# Patient Record
Sex: FEMALE | Race: WHITE | NOT HISPANIC OR LATINO | Employment: UNEMPLOYED | URBAN - METROPOLITAN AREA
[De-identification: names, ages, dates, MRNs, and addresses within clinical notes are randomized per-mention and may not be internally consistent; named-entity substitution may affect disease eponyms.]

---

## 2017-10-31 ENCOUNTER — TRANSFERRED RECORDS (OUTPATIENT)
Dept: HEALTH INFORMATION MANAGEMENT | Facility: CLINIC | Age: 37
End: 2017-10-31

## 2017-10-31 LAB
CHOLEST SERPL-MCNC: 169 MG/DL (ref 100–199)
GLUCOSE SERPL-MCNC: 87 MG/DL (ref 65–140)
HDLC SERPL-MCNC: 45 MG/DL
LDLC SERPL CALC-MCNC: 100 MG/DL
NONHDLC SERPL-MCNC: 124 MG/DL
PAP-ABSTRACT: NORMAL
TRIGL SERPL-MCNC: 120 MG/DL
TSH SERPL-ACNC: 2.31 UIU/ML (ref 0.35–4.94)

## 2017-12-03 ENCOUNTER — HEALTH MAINTENANCE LETTER (OUTPATIENT)
Age: 37
End: 2017-12-03

## 2019-04-13 ENCOUNTER — OFFICE VISIT (OUTPATIENT)
Dept: URGENT CARE | Facility: URGENT CARE | Age: 39
End: 2019-04-13
Payer: COMMERCIAL

## 2019-04-13 VITALS
TEMPERATURE: 99 F | HEART RATE: 80 BPM | SYSTOLIC BLOOD PRESSURE: 117 MMHG | OXYGEN SATURATION: 98 % | DIASTOLIC BLOOD PRESSURE: 76 MMHG

## 2019-04-13 DIAGNOSIS — K12.0 APHTHOUS ULCER: Primary | ICD-10-CM

## 2019-04-13 LAB
DEPRECATED S PYO AG THROAT QL EIA: NORMAL
SPECIMEN SOURCE: NORMAL

## 2019-04-13 PROCEDURE — 87880 STREP A ASSAY W/OPTIC: CPT | Performed by: PHYSICIAN ASSISTANT

## 2019-04-13 PROCEDURE — 87081 CULTURE SCREEN ONLY: CPT | Performed by: PHYSICIAN ASSISTANT

## 2019-04-13 PROCEDURE — 99203 OFFICE O/P NEW LOW 30 MIN: CPT | Performed by: PHYSICIAN ASSISTANT

## 2019-04-13 RX ORDER — MONTELUKAST SODIUM 10 MG/1
10 TABLET ORAL
COMMUNITY
Start: 2019-03-08 | End: 2019-09-16

## 2019-04-13 RX ORDER — LEVOTHYROXINE SODIUM 100 UG/1
100 TABLET ORAL
COMMUNITY
Start: 2019-03-08 | End: 2019-09-16

## 2019-04-13 NOTE — PROGRESS NOTES
SUBJECTIVE:   Stephanie Bradshaw is a 38 year old female presenting with a chief complaint of sore throat.  Onset of symptoms was 4 day(s) ago.  Course of illness is worsening.    Severity moderately severe  Current and Associated symptoms: NONE. Denies headache, fever, chills, URI symptoms, cough or rash  Treatment measures tried include None tried.  Predisposing factors include Thought she had a tonsil stone, so tried to remove, then an ulcer showed up    Past Medical History:   Diagnosis Date     NO ACTIVE PROBLEMS      Current Outpatient Medications   Medication Sig Dispense Refill     levothyroxine (SYNTHROID) 100 MCG tablet Take 100 mcg by mouth       montelukast (SINGULAIR) 10 MG tablet Take 10 mg by mouth       sertraline (ZOLOFT) 100 MG tablet Take by mouth daily 1 x a day       fluticasone (FLONASE) 50 MCG/ACT nasal spray Spray 2 sprays into both nostrils daily 1 spray daily       omeprazole (PRILOSEC) 20 MG capsule 30 mg BID x 30 days 90 capsule 6     Social History     Tobacco Use     Smoking status: Former Smoker     Types: Cigarettes     Last attempt to quit: 1/19/2009     Years since quitting: 10.2   Substance Use Topics     Alcohol use: Yes     Comment: rarely       ROS:  Review of systems negative except as stated above.    OBJECTIVE:  /76   Pulse 80   Temp 99  F (37.2  C) (Oral)   SpO2 98%   GENERAL APPEARANCE: healthy, alert and no distress  EYES: EOMI,  PERRL, conjunctiva clear  HENT: TM's normal bilaterally, tonsillar erythema, tonsillar exudate and L anterior tonsillar column has ulcer noted   NECK: supple, nontender, no lymphadenopathy  RESP: lungs clear to auscultation - no rales, rhonchi or wheezes  CV: regular rates and rhythm, normal S1 S2, no murmur noted  NEURO: Normal strength and tone, sensory exam grossly normal,  normal speech and mentation  SKIN: no suspicious lesions or rashes    Results for orders placed or performed in visit on 04/13/19   Strep, Rapid Screen   Result Value  Ref Range    Specimen Description Throat     Rapid Strep A Screen       NEGATIVE: No Group A streptococcal antigen detected by immunoassay, await culture report.       ASSESSMENT / PLAN:  1. Aphthous ulcer  Use mouthwash as directed for aphthous ulcer  Avoid spicy, acidity and hot foods  - Beta strep group A culture  - magic mouthwash (ENTER INGREDIENTS IN COMMENTS) suspension; Swish and spit 5mL of solution every 4 hours as needed for sore throat  Dispense: 120 mL; Refill: 0    I have discussed the patient's diagnosis and my plan of treatment with the patient. We went over any labs or imaging. Patient is aware to come back in with worsening symptoms or if no relief despite treatment plan.  Patient verbalizes understanding. All questions were addressed and answered.   Cesia Davis PA-C

## 2019-04-14 LAB
BACTERIA SPEC CULT: NORMAL
SPECIMEN SOURCE: NORMAL

## 2019-09-16 ENCOUNTER — OFFICE VISIT (OUTPATIENT)
Dept: FAMILY MEDICINE | Facility: CLINIC | Age: 39
End: 2019-09-16
Payer: COMMERCIAL

## 2019-09-16 ENCOUNTER — TELEPHONE (OUTPATIENT)
Dept: FAMILY MEDICINE | Facility: CLINIC | Age: 39
End: 2019-09-16

## 2019-09-16 VITALS
TEMPERATURE: 99 F | DIASTOLIC BLOOD PRESSURE: 74 MMHG | SYSTOLIC BLOOD PRESSURE: 110 MMHG | WEIGHT: 187 LBS | RESPIRATION RATE: 19 BRPM | HEIGHT: 64 IN | BODY MASS INDEX: 31.92 KG/M2 | HEART RATE: 72 BPM | OXYGEN SATURATION: 97 %

## 2019-09-16 DIAGNOSIS — R10.9 ABDOMINAL CRAMPING: ICD-10-CM

## 2019-09-16 DIAGNOSIS — J45.909 ASTHMA DUE TO ENVIRONMENTAL ALLERGIES: ICD-10-CM

## 2019-09-16 DIAGNOSIS — Z11.4 ENCOUNTER FOR SCREENING FOR HIV: ICD-10-CM

## 2019-09-16 DIAGNOSIS — N94.3 PREMENSTRUAL SYNDROME: Primary | ICD-10-CM

## 2019-09-16 DIAGNOSIS — E03.9 HYPOTHYROIDISM, UNSPECIFIED TYPE: ICD-10-CM

## 2019-09-16 DIAGNOSIS — Z12.83 SKIN CANCER SCREENING: ICD-10-CM

## 2019-09-16 DIAGNOSIS — Z78.9 VEGETARIAN DIET: ICD-10-CM

## 2019-09-16 LAB
DEPRECATED CALCIDIOL+CALCIFEROL SERPL-MC: 32 UG/L (ref 20–75)
ERYTHROCYTE [DISTWIDTH] IN BLOOD BY AUTOMATED COUNT: 12.7 % (ref 10–15)
FOLATE SERPL-MCNC: 16.2 NG/ML
HCT VFR BLD AUTO: 41.7 % (ref 35–47)
HGB BLD-MCNC: 14.7 G/DL (ref 11.7–15.7)
HIV 1+2 AB+HIV1 P24 AG SERPL QL IA: NONREACTIVE
MCH RBC QN AUTO: 31.5 PG (ref 26.5–33)
MCHC RBC AUTO-ENTMCNC: 35.3 G/DL (ref 31.5–36.5)
MCV RBC AUTO: 89 FL (ref 78–100)
PLATELET # BLD AUTO: 231 10E9/L (ref 150–450)
RBC # BLD AUTO: 4.67 10E12/L (ref 3.8–5.2)
TSH SERPL DL<=0.005 MIU/L-ACNC: 1.04 MU/L (ref 0.4–4)
VIT B12 SERPL-MCNC: 417 PG/ML (ref 193–986)
WBC # BLD AUTO: 5.1 10E9/L (ref 4–11)

## 2019-09-16 PROCEDURE — 87389 HIV-1 AG W/HIV-1&-2 AB AG IA: CPT | Performed by: FAMILY MEDICINE

## 2019-09-16 PROCEDURE — 99214 OFFICE O/P EST MOD 30 MIN: CPT | Performed by: FAMILY MEDICINE

## 2019-09-16 PROCEDURE — 83921 ORGANIC ACID SINGLE QUANT: CPT | Performed by: FAMILY MEDICINE

## 2019-09-16 PROCEDURE — 82746 ASSAY OF FOLIC ACID SERUM: CPT | Performed by: FAMILY MEDICINE

## 2019-09-16 PROCEDURE — 85027 COMPLETE CBC AUTOMATED: CPT | Performed by: FAMILY MEDICINE

## 2019-09-16 PROCEDURE — 83090 ASSAY OF HOMOCYSTEINE: CPT | Performed by: FAMILY MEDICINE

## 2019-09-16 PROCEDURE — 82607 VITAMIN B-12: CPT | Performed by: FAMILY MEDICINE

## 2019-09-16 PROCEDURE — 84443 ASSAY THYROID STIM HORMONE: CPT | Performed by: FAMILY MEDICINE

## 2019-09-16 PROCEDURE — 82306 VITAMIN D 25 HYDROXY: CPT | Performed by: FAMILY MEDICINE

## 2019-09-16 PROCEDURE — 36415 COLL VENOUS BLD VENIPUNCTURE: CPT | Performed by: FAMILY MEDICINE

## 2019-09-16 RX ORDER — MONTELUKAST SODIUM 10 MG/1
10 TABLET ORAL AT BEDTIME
Qty: 90 TABLET | Refills: 3 | Status: SHIPPED | OUTPATIENT
Start: 2019-09-16 | End: 2022-06-07

## 2019-09-16 RX ORDER — HYDROCODONE BITARTRATE AND ACETAMINOPHEN 5; 325 MG/1; MG/1
1 TABLET ORAL
COMMUNITY
Start: 2019-03-08 | End: 2020-01-31

## 2019-09-16 RX ORDER — LEVOTHYROXINE SODIUM 100 UG/1
100 TABLET ORAL DAILY
Qty: 90 TABLET | Refills: 3
Start: 2019-09-16 | End: 2019-09-17

## 2019-09-16 RX ORDER — ALBUTEROL SULFATE 90 UG/1
2 AEROSOL, METERED RESPIRATORY (INHALATION) PRN
COMMUNITY
Start: 2019-03-08

## 2019-09-16 RX ORDER — HYDROCODONE BITARTRATE AND ACETAMINOPHEN 5; 325 MG/1; MG/1
1 TABLET ORAL DAILY PRN
Qty: 40 TABLET | Refills: 0 | Status: SHIPPED | OUTPATIENT
Start: 2019-09-16 | End: 2019-09-19

## 2019-09-16 ASSESSMENT — MIFFLIN-ST. JEOR: SCORE: 1513.23

## 2019-09-16 NOTE — PATIENT INSTRUCTIONS
Zoloft 50 mg daily for one month  If symptoms continue to be present then increasing to 75 mg daily for one month  If symptoms continue to be present then increasing to 100 mg daily for one month.   Follow if symptoms worsen or fail to improve.      E-visit or phone visit in 6 months if insurance doesn't cover the one year script for Norco.

## 2019-09-16 NOTE — PROGRESS NOTES
"Subjective     Stephanie Bradshaw is a 38 year old female who presents to clinic today for the following health issues:    HPI     Asthma Follow-Up      Was ACT completed today?  Yes      How many days per week do you miss taking your asthma controller medication?  0    Please describe any recent triggers for your asthma: exercise or sports    Have you had any Emergency Room Visits, Urgent Care Visits, or Hospital Admissions since your last office visit?  No      Hypothyroidism Follow-up      Since last visit, patient describes the following symptoms: fatigue and hair loss      Medication Followup of hydrocodone     Taking Medication as prescribed: yes    Side Effects:  None    Medication Helping Symptoms:  Yes    Taking for menstrual cramps        Premenstrual syndrome - She has progesterone IUD. She takes Zoloft 25 mg daily for around one year to help control the symptoms. She feels that symptoms could be better controlled and interested in increasing medication dose.     She takes Norco 5-325 mg daily for abdominal cramping prior to menstrual cycle. Since IUD, she has intermittent cramping. Last GYN visit was around 2 years ago. Since she moved to [place] - she was with [facility] provider.     Allergies - She takes Flonase and allegra.     Patient has been a vegetarian for 1.5 years and wants routine lab work. Not on any supplements.       Reviewed and updated as needed this visit by Provider         Review of Systems   ROS COMP: Constitutional, HEENT, cardiovascular, pulmonary, gi and gu systems are negative, except as otherwise noted.      Objective    There were no vitals taken for this visit.  There is no height or weight on file to calculate BMI.  Physical Exam   /74 (BP Location: Right arm, Patient Position: Sitting, Cuff Size: Adult Regular)   Pulse 72   Temp 99  F (37.2  C) (Oral)   Resp 19   Ht 1.626 m (5' 4\")   Wt 84.8 kg (187 lb)   SpO2 97%   BMI 32.10 kg/m    GENERAL: healthy, alert and no " distress  EYES: Eyes grossly normal to inspection  HENT: nose and mouth without ulcers or lesions  NECK: no adenopathy, no asymmetry, masses, or scars and thyroid normal to palpation  RESP: lungs clear to auscultation - no rales, rhonchi or wheezes  CV: regular rate and rhythm, normal S1 S2  MS: no gross musculoskeletal defects noted  SKIN: no suspicious lesions or rashes  NEURO:mentation intact and speech normal  PSYCH: mentation appears normal, affect normal    Diagnostic Test Results:  none         Assessment & Plan       1. Premenstrual syndrome  - Reviewed care everywhere.   - advised below  Zoloft 50 mg daily for one month  If symptoms continue to be present then increasing to 75 mg daily for one month  If symptoms continue to be present then increasing to 100 mg daily for one month.   Follow if symptoms worsen or fail to improve.  - sertraline (ZOLOFT) 50 MG tablet; Take 1 tablet (50 mg) by mouth daily  Dispense: 90 tablet; Refill: 3    2. Vegetarian diet  - ordered below to ensure no vitamin deficiencies as patient is a vegetarian   - Vitamin D Deficiency  - CBC with platelets  - Folate  - Vitamin B12  - Homocysteine  - Methylmalonic Acid  - JUST IN CASE    3. Abdominal cramping  - Reviewed care everywhere and [place] . Patient has used 40# over the last year. Discussed contact - need to have patient sign it during next visit as I was unable to find the letter.   - HYDROcodone-acetaminophen (NORCO) 5-325 MG tablet; Take 1 tablet by mouth daily as needed for pain Last one year (09/2019)  Dispense: 40 tablet; Refill: 0    4. Asthma due to environmental allergies  - stable   - montelukast (SINGULAIR) 10 MG tablet; Take 1 tablet (10 mg) by mouth At Bedtime  Dispense: 90 tablet; Refill: 3    5. Hypothyroidism, unspecified type  - levothyroxine (SYNTHROID) 100 MCG tablet; Take 1 tablet (100 mcg) by mouth daily Please dispense brand name  Dispense: 90 tablet; Refill: 3  - TSH with free T4 reflex    6. Encounter for  screening for HIV  - HIV Antigen Antibody Combo    7. Skin cancer screening  - DERMATOLOGY REFERRAL      Return in about 6 months (around 3/16/2020) for Routine Visit.     E-visit or phone visit in 6 months if insurance doesn't cover the one year script for [place].     Sophia Caal MD  [facility]

## 2019-09-16 NOTE — TELEPHONE ENCOUNTER
PA needed on Hydrocodone/APAP 5/325mg  Pt insurance is [employer]  Insurance phone number: [phone]  Pt ID number: [id number]  Please let us know when PA is granted/denied.    Insurance only allows a 7 days supply max without a PA.    Hasmukh Rios  [facility]  [phone]    Thank you.

## 2019-09-17 RX ORDER — LEVOTHYROXINE SODIUM 100 UG/1
100 TABLET ORAL DAILY
Qty: 90 TABLET | Refills: 3 | Status: SHIPPED | OUTPATIENT
Start: 2019-09-17 | End: 2020-09-23

## 2019-09-18 LAB — HCYS SERPL-SCNC: 6.9 UMOL/L (ref 4–12)

## 2019-09-19 NOTE — TELEPHONE ENCOUNTER
Central Prior Authorization Team  Phone: [phone]    PA Initiation    Medication: Hydrocodone/APAP 5/325mg  Insurance Company: [employer] - Phone [phone] Fax [phone]  Pharmacy Filling the Rx: [place], [place] - [address]  Filling Pharmacy Phone: [phone]  Filling Pharmacy Fax:    Start Date: 9/19/2019

## 2019-09-20 NOTE — TELEPHONE ENCOUNTER
Prior Authorization Approval    Authorization Effective Date: 9/20/2019  Authorization Expiration Date: 3/18/2020  Medication: Hydrocodone/APAP 5/325mg- APPROVED   Approved Dose/Quantity:   Reference #:     Insurance Company: "[employer]" - Phone [phone] Fax [phone]  Expected CoPay:       CoPay Card Available:      Foundation Assistance Needed:    Which Pharmacy is filling the prescription (Not needed for infusion/clinic administered): [facility] [place], [place] - [address]  Pharmacy Notified: Yes  Patient Notified: Comment:  **Instructed pharmacy to notify patient when script is ready to /ship.**

## 2019-09-23 LAB — METHYLMALONATE SERPL-SCNC: 0.15 UMOL/L (ref 0–0.4)

## 2019-09-24 ENCOUNTER — OFFICE VISIT (OUTPATIENT)
Dept: OPHTHALMOLOGY | Facility: CLINIC | Age: 39
End: 2019-09-24
Payer: COMMERCIAL

## 2019-09-24 DIAGNOSIS — H52.13 MYOPIA OF BOTH EYES: Primary | ICD-10-CM

## 2019-09-24 ASSESSMENT — CONF VISUAL FIELD
METHOD: COUNTING FINGERS
OD_NORMAL: 1
OS_NORMAL: 1

## 2019-09-24 ASSESSMENT — CUP TO DISC RATIO
OS_RATIO: 0.2
OD_RATIO: 0.2

## 2019-09-24 ASSESSMENT — VISUAL ACUITY
OD_CC: J1+
CORRECTION_TYPE: GLASSES
OD_CC: 20/20
OS_CC: 20/20
METHOD: SNELLEN - LINEAR
OS_CC: J1+

## 2019-09-24 ASSESSMENT — REFRACTION_MANIFEST
OS_SPHERE: -1.00
OD_CYLINDER: +0.25
OS_CYLINDER: +0.25
OS_AXIS: 066
OD_SPHERE: -0.75
OD_AXIS: 115

## 2019-09-24 ASSESSMENT — TONOMETRY
OS_IOP_MMHG: 18
IOP_METHOD: ICARE
OD_IOP_MMHG: 17

## 2019-09-24 ASSESSMENT — SLIT LAMP EXAM - LIDS
COMMENTS: NORMAL
COMMENTS: NORMAL

## 2019-09-24 ASSESSMENT — EXTERNAL EXAM - LEFT EYE: OS_EXAM: NORMAL

## 2019-09-24 ASSESSMENT — REFRACTION_WEARINGRX
OS_SPHERE: -0.75
OD_SPHERE: -0.50
OS_AXIS: 066
OD_AXIS: 111
SPECS_TYPE: SVL
OS_CYLINDER: +0.50
OD_CYLINDER: +0.25

## 2019-09-24 ASSESSMENT — EXTERNAL EXAM - RIGHT EYE: OD_EXAM: NORMAL

## 2019-09-24 NOTE — PROGRESS NOTES
History  HPI     Annual Eye Exam     Associated symptoms include Negative for eye pain, dryness, tearing, floaters and flashes.              Comments     Last CEE about 1.5 years ago.  Pt thinks VA is stable.  Pt wants new glasses Rx, no CLS Rx.  Pt has no eye pain or other concerns today.          Last edited by Israel Kaur COT on 9/24/2019  3:12 PM. (History)          Assessment/Plan  (H52.13) Myopia of both eyes  (primary encounter diagnosis)  Comment: Myopia both eyes, wears glasses as needed  Plan: REFRACTION         Educated patient on condition and clinical findings. Dispensed spectacle prescription for as-needed wear. Educated patient on possibility of adaptation period, if symptoms do not improve return to clinic for further testing.    Return to clinic in 1 year for comprehensive eye exam.    Complete documentation of historical and exam elements from today's encounter can  be found in the full encounter summary report (not reduplicated in this progress  note). I personally obtained the chief complaint(s) and history of present illness. I  confirmed and edited as necessary the review of systems, past medical/surgical  history, family history, social history, and examination findings as documented by  others; and I examined the patient myself. I personally reviewed the relevant tests,  images, and reports as documented above. I formulated and edited as necessary the  assessment and plan and discussed the findings and management plan with the  patient and family.    Juan Cabezas OD, FAAO

## 2019-12-19 ENCOUNTER — MYC REFILL (OUTPATIENT)
Dept: FAMILY MEDICINE | Facility: CLINIC | Age: 39
End: 2019-12-19

## 2019-12-19 DIAGNOSIS — E03.9 HYPOTHYROIDISM, UNSPECIFIED TYPE: ICD-10-CM

## 2019-12-19 RX ORDER — LEVOTHYROXINE SODIUM 100 UG/1
100 TABLET ORAL DAILY
Qty: 90 TABLET | Refills: 3 | Status: CANCELLED | OUTPATIENT
Start: 2019-12-19

## 2019-12-23 ENCOUNTER — MYC REFILL (OUTPATIENT)
Dept: FAMILY MEDICINE | Facility: CLINIC | Age: 39
End: 2019-12-23

## 2019-12-23 DIAGNOSIS — E03.9 HYPOTHYROIDISM, UNSPECIFIED TYPE: ICD-10-CM

## 2019-12-23 RX ORDER — LEVOTHYROXINE SODIUM 100 UG/1
100 TABLET ORAL DAILY
Qty: 90 TABLET | Refills: 3 | Status: CANCELLED | OUTPATIENT
Start: 2019-12-23

## 2019-12-24 NOTE — TELEPHONE ENCOUNTER
"Requested Prescriptions   Pending Prescriptions Disp Refills     levothyroxine (SYNTHROID) 100 MCG tablet 90 tablet 3     Sig: Take 1 tablet (100 mcg) by mouth daily Please dispense brand name       Thyroid Protocol Passed - 12/23/2019  8:20 AM        Passed - Patient is 12 years or older        Passed - Recent (12 mo) or future (30 days) visit within the authorizing provider's specialty     Patient has had an office visit with the authorizing provider or a provider within the authorizing providers department within the previous 12 mos or has a future within next 30 days. See \"Patient Info\" tab in inbasket, or \"Choose Columns\" in Meds & Orders section of the refill encounter.              Passed - Medication is active on med list        Passed - Normal TSH on file in past 12 months     Recent Labs   Lab Test 09/16/19  0949   TSH 1.04              Passed - No active pregnancy on record     If patient is pregnant or has had a positive pregnancy test, please check TSH.          Passed - No positive pregnancy test in past 12 months     If patient is pregnant or has had a positive pregnancy test, please check TSH.          Duplicate sent [employer]    "

## 2020-01-29 ENCOUNTER — OFFICE VISIT (OUTPATIENT)
Dept: OBGYN | Facility: CLINIC | Age: 40
End: 2020-01-29
Payer: COMMERCIAL

## 2020-01-29 VITALS — SYSTOLIC BLOOD PRESSURE: 120 MMHG | OXYGEN SATURATION: 97 % | HEART RATE: 84 BPM | DIASTOLIC BLOOD PRESSURE: 88 MMHG

## 2020-01-29 DIAGNOSIS — Z30.432 ENCOUNTER FOR IUD REMOVAL: Primary | ICD-10-CM

## 2020-01-29 LAB — HCG UR QL: NEGATIVE

## 2020-01-29 PROCEDURE — 81025 URINE PREGNANCY TEST: CPT | Performed by: OBSTETRICS & GYNECOLOGY

## 2020-01-29 PROCEDURE — 58301 REMOVE INTRAUTERINE DEVICE: CPT | Performed by: OBSTETRICS & GYNECOLOGY

## 2020-01-29 NOTE — PROGRESS NOTES
Stephanie Bradshaw is a 39 year old P0 who presents requesting IUD removal.  She plans pregnancy.       OBJECTIVE:  Healthy female in NAD.  /88   Pulse 84   SpO2 97%      Speculum is placed in the vagina, and the IUD string is grasped with a ring forceps, and removed without difficulty.      She tolerated this well.     ASSESSMENT:  IUD removal.    PLAN: RTC as needed. (She has an office visit this week with Dr. Siddiqui for family planning)

## 2020-01-31 ENCOUNTER — OFFICE VISIT (OUTPATIENT)
Dept: OBGYN | Facility: CLINIC | Age: 40
End: 2020-01-31
Payer: COMMERCIAL

## 2020-01-31 VITALS
TEMPERATURE: 98.6 F | HEART RATE: 65 BPM | WEIGHT: 195 LBS | BODY MASS INDEX: 33.47 KG/M2 | SYSTOLIC BLOOD PRESSURE: 120 MMHG | DIASTOLIC BLOOD PRESSURE: 81 MMHG

## 2020-01-31 DIAGNOSIS — E03.9 HYPOTHYROIDISM, UNSPECIFIED TYPE: ICD-10-CM

## 2020-01-31 DIAGNOSIS — Z80.3 FAMILY HISTORY OF MALIGNANT NEOPLASM OF BREAST: ICD-10-CM

## 2020-01-31 DIAGNOSIS — Z31.49 PROCREATION MANAGEMENT INVESTIGATION AND TESTING: Primary | ICD-10-CM

## 2020-01-31 DIAGNOSIS — Z78.9 VEGETARIAN: ICD-10-CM

## 2020-01-31 DIAGNOSIS — F32.81 PMDD (PREMENSTRUAL DYSPHORIC DISORDER): ICD-10-CM

## 2020-01-31 PROCEDURE — 99214 OFFICE O/P EST MOD 30 MIN: CPT | Performed by: OBSTETRICS & GYNECOLOGY

## 2020-01-31 NOTE — Clinical Note
Please abstract the following data from this visit with this patient into the appropriate field in Epic:Tests that can be patient reported without a hard copy:Pap smear done on this date: 10/31/2017 (approximately), by this group: [facility], results were NIL Neg HPV. Other Tests found in the patient's chart through Chart Review/Care Everywhere:{Abstract Quality List (Optional):391286}Note to Abstraction: If this section is blank, no results were found via Chart Review/Care Everywhere.

## 2020-01-31 NOTE — PROGRESS NOTES
Chief Complaint: infertility consult     HPI:   Stephanie Bradshaw is a 39 year old female is a 39 year old G0 female who presents with desired procreation.     She had her Mirena IUD removed 2 days ago and would like to discuss getting pregnant.  Her partner is present today, has previously undergone vasectomy, but is planning on pursuing reversal.  Likely wouldn't pursue IVF if there were difficulties in conceiving; would rather pursue adoption in that case.    Prior pregnancy history is as follows:   OB History    Para Term  AB Living   0 0 0 0 0 0   SAB TAB Ectopic Multiple Live Births   0 0 0 0 0       Gynecologic History:  Menstrual History: No LMP recorded. (Menstrual status: IUD). Menses are regular q 28-30 days, lasting 3-5 days. Menarche at age 12.    Dysmenorrhea severe, occurring throughout cycle.   Cyclic symptoms include  irritability and moodiness.   Additional symptoms include none  Ovulation predictor kits: negative  Pelvic/abdominal pain: No    STI:No STD history  History of PID: No   Last PAP smear:  Normal in 10/2017.  HPV not done, so due in 10/2020  Fibroids: No  Uterine anomalies:  No  SEGUNDO exposure in utero: No  Contraceptive history: Mirena IUD and depoprovera  Deep dyspareunia: No  Hirsuitism: No  Galactorrhea: No    History of thyroid problems or symptoms (ie. Intolerance to heat/cold, changes in hair growth, body weight:  Yes  Hypothyroid  History of chemo/radiation:  No    The patient has been trying to conceive for n/a.  Use of lubricants: No    Male factor:   Partner is 36 years old.   No history of trauma to the genitalia, medications, tobacco, drug use. History of vasectomythat plan to have reversed  Prior children: none   Erectile dysfunction: No  Ejaculatory dysfunction: No  Family history of cystic fibrosis: No  Family history of mental retardation: No  Seen by urologist: No   Semen analysis: No      PAST INFERTILITY EVALUATION AND TREATMENT:  Patient's evaluation has  included:  none    Hormonal evaluation has included:  none    Allergies: Patient has no known allergies.                    Past Medical History:   Diagnosis Date     NO ACTIVE PROBLEMS      Thyroid disease 2013     Uncomplicated asthma        Past Surgical History:   Procedure Laterality Date     ABDOMEN SURGERY      Laparoscopy     GYN SURGERY      hysteroscopy     HC TOOTH EXTRACTION W/FORCEP             Social History     Tobacco Use     Smoking status: Former Smoker     Packs/day: 0.00     Years: 0.00     Pack years: 0.00     Types: Cigarettes     Last attempt to quit: 2009     Years since quittin.0     Smokeless tobacco: Never Used   Substance Use Topics     Alcohol use: Yes     Comment: rarely              Family History   Problem Relation Age of Onset     Cancer Maternal Grandfather      Other Cancer Maternal Grandfather         Lymphoma     Cancer Mother         menangiomna     Depression Mother      Thyroid Disease Mother      Other Cancer Mother         Bladder     Anxiety Disorder Mother      Alcohol/Drug Father      Substance Abuse Father      Depression Niece      Anxiety Disorder Niece      Anxiety Disorder Sister      Asthma Niece      Thyroid Disease Sister      Breast Cancer Paternal Aunt 20        diagnosed in 20s,  of breast cancer 30 years later.     Glaucoma No family hx of      Macular Degeneration No family hx of        Current Outpatient Medications   Medication Sig Dispense Refill     albuterol (PROAIR HFA/PROVENTIL HFA/VENTOLIN HFA) 108 (90 Base) MCG/ACT inhaler Inhale 2 puffs into the lungs       fluticasone (FLONASE) 50 MCG/ACT nasal spray Spray 2 sprays into both nostrils daily 1 spray daily       levothyroxine (SYNTHROID) 100 MCG tablet Take 1 tablet (100 mcg) by mouth daily Please dispense brand name 90 tablet 3     montelukast (SINGULAIR) 10 MG tablet Take 1 tablet (10 mg) by mouth At Bedtime 90 tablet 3     sertraline (ZOLOFT) 50 MG tablet Take 1 tablet (50 mg)  "by mouth daily 90 tablet 3       Estimated body mass index is 33.47 kg/m  as calculated from the following:    Height as of 19: 1.626 m (5' 4\").    Weight as of this encounter: 88.5 kg (195 lb).    Exam:  /81 (BP Location: Left arm, Patient Position: Sitting, Cuff Size: Adult Regular)   Pulse 65   Temp 98.6  F (37  C) (Oral)   Wt 88.5 kg (195 lb)   BMI 33.47 kg/m    Gen:  NAD, sitting comfortably  Psych:  Appropriate mood and affect  Neuro:  Gait WNL.  Sensation and strength grossly intact    ASSESSMENT/plan:    1. Procreation management investigation and testing  Recommended daily prenatal vitamin and labs below.  Also discussed HSG, but will hold off on ordering that for now, pending lab results.  They likely wouldn't do anything invasive in terms of fertility treatment, so we will do a more step-wise evaluation  - Follicle stimulating hormone; Future  - Anti-Mullerian hormone; Future  - Estradiol; Future  - Prolactin; Future    2. Hypothyroidism, unspecified type  TSH within recommended range for pregnancy.  No changes at this point    3. Vegetarian  Discussed this isn't a concern as long as she is getting all the protein and nutrients she needs    4. PMDD (premenstrual dysphoric disorder)  Takes sertraline and does well on this. Discussed wouldn't recommend stopping if this has previously been beneficial for her.  There is a risk of  withdrawal, however, in my experience, babies do well.    5.  Family history of malignant neoplasm of breast  Had an aunt who was diagnosed with breast cancer in her 20s and passed away from the disease 30 years later.  Patient thinks genetic testing was done, but unsure of results.  This aunt does have daughters, so she will ask her family.  Recommendation for breast cancer screening for patient pending genetic testing results.  She will notify me of what she finds out via [facility]      Julia Siddiqui MD    Please note greater than 50% of this 30 minute " appointment were spent in counseling with the patient on issues described above, including fertility evaluation and possible treatments, as well as impact of other health issues on fertility.

## 2020-01-31 NOTE — NURSING NOTE
"Chief Complaint   Patient presents with     Consult     family planning       Initial /81 (BP Location: Left arm, Patient Position: Sitting, Cuff Size: Adult Regular)   Pulse 65   Temp 98.6  F (37  C) (Oral)   Wt 88.5 kg (195 lb)   BMI 33.47 kg/m   Estimated body mass index is 33.47 kg/m  as calculated from the following:    Height as of 19: 1.626 m (5' 4\").    Weight as of this encounter: 88.5 kg (195 lb).  BP completed using cuff size: regular    Questioned patient about current smoking habits.  Pt. quit smoking some time ago.          The following HM Due: NONE      The following patient reported/Care Every where data was sent to:  P ABSTRACT QUALITY INITIATIVES [[id number]]  Pap smear done on this date:10/31/2017 (approximately), by this group: [facility], results were NIL Neg HPV.       Phyllis Lewis MA           "

## 2020-01-31 NOTE — PATIENT INSTRUCTIONS
Call the consumer price line to ask about cost of labs.  [phone]    Call on the first day of your menstrual cycle to schedule lab only visit.  If this falls on the weekend, let me know and we can give instructions on going to outpatient lab in the hospital.    See what you can find out about your aunt's genetic testing as it relates to her breast cancer.  Then we'll decide best time for you to start mammograms.

## 2020-02-18 ENCOUNTER — TELEPHONE (OUTPATIENT)
Dept: FAMILY MEDICINE | Facility: CLINIC | Age: 40
End: 2020-02-18

## 2020-02-18 PROBLEM — J45.20 ASTHMA IN ADULT, MILD INTERMITTENT, UNCOMPLICATED: Status: ACTIVE | Noted: 2020-02-18

## 2020-02-18 NOTE — TELEPHONE ENCOUNTER
Panel Management Review      Patient has the following on her problem list:   Asthma review   No flowsheet data found.   1. Is Asthma diagnosis on the Problem List? Yes    2. Is Asthma listed on Health Maintenance? Yes    3. Patient is due for:  ACT and AAP      Composite cancer screening  Chart review shows that this patient is due/due soon for the following None  Summary:    Patient is due/failing the following:   ACT and PHYSICAL    Action needed:   Patient needs office visit for physical and Patient needs to do ACT.    Type of outreach:    Phone, left message for patient to call back.     Questions for provider review:    None                                                                                                                                    Jack Webster MA       Chart routed to Care Team .

## 2020-02-24 ENCOUNTER — MYC REFILL (OUTPATIENT)
Dept: FAMILY MEDICINE | Facility: CLINIC | Age: 40
End: 2020-02-24

## 2020-02-24 DIAGNOSIS — E03.9 HYPOTHYROIDISM, UNSPECIFIED TYPE: ICD-10-CM

## 2020-02-24 DIAGNOSIS — N94.3 PREMENSTRUAL SYNDROME: ICD-10-CM

## 2020-02-24 RX ORDER — LEVOTHYROXINE SODIUM 100 UG/1
100 TABLET ORAL DAILY
Qty: 90 TABLET | Refills: 3 | Status: CANCELLED | OUTPATIENT
Start: 2020-02-24

## 2020-02-24 NOTE — TELEPHONE ENCOUNTER
"Requested Prescriptions   Pending Prescriptions Disp Refills     sertraline (ZOLOFT) 50 MG tablet 90 tablet 3     Sig: Take 1 tablet (50 mg) by mouth daily  Last Written Prescription Date:  9/16/2019  Last Fill Quantity: 90 tablet,  # refills: 3   Last Office Visit: 9/16/2019   Future Office Visit:            SSRIs Protocol Passed - 2/24/2020  7:51 AM        Passed - Recent (12 mo) or future (30 days) visit within the authorizing provider's specialty     Patient has had an office visit with the authorizing provider or a provider within the authorizing providers department within the previous 12 mos or has a future within next 30 days. See \"Patient Info\" tab in inbasket, or \"Choose Columns\" in Meds & Orders section of the refill encounter.            Passed - Medication is active on med list        Passed - Patient is age 18 or older        Passed - No active pregnancy on record        Passed - No positive pregnancy test in last 12 months     _________________________________________________________________       levothyroxine (SYNTHROID) 100 MCG tablet 90 tablet 3     Sig: Take 1 tablet (100 mcg) by mouth daily Please dispense brand name  Last Written Prescription Date:  9/17/2019  Last Fill Quantity: 90 tablet,  # refills: 3   Last Office Visit: 9/16/2019   Future Office Visit:            Thyroid Protocol Passed - 2/24/2020  7:51 AM        Passed - Patient is 12 years or older        Passed - Recent (12 mo) or future (30 days) visit within the authorizing provider's specialty     Patient has had an office visit with the authorizing provider or a provider within the authorizing providers department within the previous 12 mos or has a future within next 30 days. See \"Patient Info\" tab in inbasket, or \"Choose Columns\" in Meds & Orders section of the refill encounter.            Passed - Medication is active on med list        Passed - Normal TSH on file in past 12 months     Recent Labs   Lab Test 09/16/19  0949   TSH " 1.04            Passed - No active pregnancy on record     If patient is pregnant or has had a positive pregnancy test, please check TSH.        Passed - No positive pregnancy test in past 12 months     If patient is pregnant or has had a positive pregnancy test, please check TSH.

## 2020-03-01 DIAGNOSIS — Z31.49 PROCREATION MANAGEMENT INVESTIGATION AND TESTING: ICD-10-CM

## 2020-03-01 LAB
ESTRADIOL SERPL-MCNC: 18 PG/ML
FSH SERPL-ACNC: 9.2 IU/L
PROLACTIN SERPL-MCNC: 12 UG/L (ref 3–27)

## 2020-03-01 PROCEDURE — 83520 IMMUNOASSAY QUANT NOS NONAB: CPT | Performed by: OBSTETRICS & GYNECOLOGY

## 2020-03-01 PROCEDURE — 84146 ASSAY OF PROLACTIN: CPT | Performed by: OBSTETRICS & GYNECOLOGY

## 2020-03-01 PROCEDURE — 82670 ASSAY OF TOTAL ESTRADIOL: CPT | Performed by: OBSTETRICS & GYNECOLOGY

## 2020-03-01 PROCEDURE — 83001 ASSAY OF GONADOTROPIN (FSH): CPT | Performed by: OBSTETRICS & GYNECOLOGY

## 2020-03-02 ENCOUNTER — HEALTH MAINTENANCE LETTER (OUTPATIENT)
Age: 40
End: 2020-03-02

## 2020-03-03 LAB — MIS SERPL-MCNC: 0.36 NG/ML (ref 0.18–11.71)

## 2020-03-19 ENCOUNTER — VIRTUAL VISIT (OUTPATIENT)
Dept: ONCOLOGY | Facility: CLINIC | Age: 40
End: 2020-03-19
Attending: OBSTETRICS & GYNECOLOGY
Payer: COMMERCIAL

## 2020-03-19 DIAGNOSIS — Z84.89 FAMILY HISTORY OF BRAIN TUMOR: ICD-10-CM

## 2020-03-19 DIAGNOSIS — Z80.42 FAMILY HISTORY OF PROSTATE CANCER: ICD-10-CM

## 2020-03-19 DIAGNOSIS — Z84.81 FAMILY HISTORY OF GENE MUTATION: ICD-10-CM

## 2020-03-19 DIAGNOSIS — Z80.3 FAMILY HISTORY OF MALIGNANT NEOPLASM OF BREAST: Primary | ICD-10-CM

## 2020-03-19 NOTE — PATIENT INSTRUCTIONS
Assessing Cancer Risk  Only about 5-10% of cancers are thought to be due to an inherited cancer susceptibility gene.    These families often have:    Several people with the same or related types of cancer    Cancers diagnosed at a young age (before age 50)    Individuals with more than one primary cancer    Multiple generations of the family affected with cancer    Some people may be candidates for genetic testing of more than one gene.  For these families, genetic testing using a cancer panel may be offered.  These panels will test different genes known to increase the risk for breast, ovarian, uterine, and/or other cancers. All of the genes discussed below have published clinical management guidelines for individuals who are found to carry a mutation. The purpose of this handout is to serve as a brief summary of the genes analyzed by the panels used to inquire about hereditary breast and gynecologic cancer:  CRISTHIAN, BRCA1, BRCA2, BRIP1, CDH1, CHEK2, MLH1, MSH2, MSH6, PMS2, EPCAM, PTEN, PALB2, RAD51C, RAD51D, and TP53.  ______________________________________________________________________________  Hereditary Breast and Ovarian Cancer Syndrome   (BRCA1 and BRCA2)  A single mutation in one of the copies of BRCA1 or BRCA2 increases the risk for breast and ovarian cancer, among others.  The risk for pancreatic cancer and melanoma may also be slightly increased in some families.  The chart below shows the chance that someone with a BRCA mutation would develop cancer in his or her lifetime1,2,3,4.        A person s ethnic background is also important to consider, as individuals of Ashkenazi Jehovah's witness ancestry have a higher chance of having a BRCA gene mutation.  There are three BRCA mutations that occur more frequently in this population.    Lira Syndrome   (MLH1, MSH2, MSH6, PMS2, and EPCAM)  Currently five genes are known to cause Lira Syndrome: MLH1, MSH2, MSH6, PMS2, and EPCAM.  A single mutation in one of the  Lira Syndrome genes increases the risk for colon, endometrial, ovarian, and stomach cancers.  Other cancers that occur less commonly in Lira Syndrome include urinary tract, skin, and brain cancers.  The chart below shows the chance that a person with Lira syndrome would develop cancer in his or her lifetime5.      *Cancer risk varies depending on Lira syndrome gene found    Cowden Syndrome   (PTEN)  Cowden syndrome is a hereditary condition that increases the risk for breast, thyroid, endometrial, colon, and kidney cancer.  Cowden syndrome is caused by a mutation in the PTEN gene.  A single mutation in one of the copies of PTEN causes Cowden syndrome and increases cancer risk.  The chart below shows the chance that someone with a PTEN mutation would develop cancer in their lifetime6,7.  Other benign features seen in some individuals with Cowden syndrome include benign skin lesions (facial papules, keratoses, lipomas), learning disability, autism, thyroid nodules, colon polyps, and larger head size.      *One recent study found breast cancer risk to be increased to 85%    Li-Fraumeni Syndrome   (TP53)  Li-Fraumeni Syndrome (LFS) is a cancer predisposition syndrome caused by a mutation in the TP53 gene. A single mutation in one of the copies of TP53 increases the risk for multiple cancers. Individuals with LFS are at an increased risk for developing cancer at a young age. The lifetime risk for development of a LFS-associated cancer is 50% by age 30 and 90% by age 60.   Core Cancers: Sarcomas, Breast, Brain, Lung, Leukemias/Lymphomas, Adrenocortical carcinomas  Other Cancers: Gastrointestinal, Thyroid, Skin, Genitourinary    Hereditary Diffuse Gastric Cancer   (CDH1)  Currently, one gene is known to cause hereditary diffuse gastric cancer (HDGC): CDH1.  Individuals with HDGC are at increased risk for diffuse gastric cancer and lobular breast cancer. Of people diagnosed with HDGC, 30-50% have a mutation in the CDH1  gene.  This suggests there are likely other genes that may cause HDGC that have not been identified yet.      Lifetime Cancer Risks    General Population HDGC    Diffuse Gastric  <1% ~80%   Breast 12% 39-52%         Additional Genes  CRISTHIAN  CRISTHIAN is a moderate-risk breast cancer gene. Women who have a mutation in CRISTHIAN can have between a 2-4 fold increased risk for breast cancer compared to the general population8. CRISTHIAN mutations have also been associated with increased risk for pancreatic cancer, however an estimate of this cancer risk is not well understood9. Individuals who inherit two CRISTHIAN mutations have a condition called ataxia-telangiectasia (AT).  This rare autosomal recessive condition affects the nervous system and immune system, and is associated with progressive cerebellar ataxia beginning in childhood.  Individuals with ataxia-telangiectasia often have a weakened immune system and have an increased risk for childhood cancers.    PALB2  Mutations in PALB2 have been shown to increase the risk of breast cancer up to 33-58% in some families; where individuals fall within this risk range is dependent upon family [id number]. PALB2 mutations have also been associated with increased risk for pancreatic cancer, although this risk has not been quantified yet.  Individuals who inherit two PALB2 mutations--one from their mother and one from their father--have a condition called Fanconi Anemia.  This rare autosomal recessive condition is associated with short stature, developmental delay, bone marrow failure, and increased risk for childhood cancers.    CHEK2   CHEK2 is a moderate-risk breast cancer gene.  Women who have a mutation in CHEK2 have around a 2-fold increased risk for breast cancer compared to the general population, and this risk may be higher depending upon family history.11,12,13 Mutations in CHEK2 have also been shown to increase the risk of a number of other cancers, including colon and prostate, however  these cancer risks are currently not well understood.    BRIP1, RAD51C and RAD51D  Mutations in BRIP1, RAD51C, and RAD51D have been shown to increase the risk of ovarian cancer and possibly female breast cancer as well14,15 .       Lifetime Cancer Risk    General Population BRIP1 RAD51C RAD51D   Ovarian 1-2% ~5-8% ~5-9% ~7-15%           Inheritance  All of the cancer syndromes reviewed above are inherited in an autosomal dominant pattern.  This means that if a parent has a mutation, each of his or her children will have a 50% chance of inheriting that same mutation.  Therefore, each child--male or female--would have a 50% chance of being at increased risk for developing cancer.      Image obtained from Genetics Home Reference, 2013     Mutations in some genes can occur de treasure, which means that a person s mutation occurred for the first time in them and was not inherited from a parent.  Now that they have the mutation, however, it can be passed on to future generations.    Genetic Testing  Genetic testing involves a blood test and will look at the genetic information in the CRISTHIAN, BRCA1, BRCA2, BRIP1, CDH1, CHEK2, MLH1, MSH2, MSH6, PMS2, EPCAM, PTEN, PALB2, RAD51C, RAD51D, and TP53 genes for any harmful mutations that are associated with increased cancer risk.  If possible, it is recommended that the person(s) who has had cancer be tested before other family members.  That person will give us the most useful information about whether or not a specific gene is associated with the cancer in the family.    Results  There are three possible results of genetic testing:    Positive--a harmful mutation was identified in one or more of the genes    Negative--no mutation was identified in any of the genes on this panel    Variant of unknown significance--a variation in one of the genes was identified, but it is unclear how this impacts cancer risk in the family    Advantages and Disadvantages   There are advantages and  disadvantages to genetic testing.    Advantages    May clarify your cancer risk    Can help you make medical decisions    May explain the cancers in your family    May give useful information to your family members (if you share your results)    Disadvantages    Possible negative emotional impact of learning about inherited cancer risk    Uncertainty in interpreting a negative test result in some situations    Possible genetic discrimination concerns (see below)    Genetic Information Nondiscrimination Act (NAUN)  NAUN is a federal law that protects individuals from health insurance or employment discrimination based on a genetic test result alone.  Although rare, there are currently no legal discrimination protections in terms of life insurance, long term care, or disability insurances.  Visit the [employer] website to learn more.    Reducing Cancer Risk  All of the genes described above have nationally recognized cancer screening guidelines that would be recommended for individuals who test positive.  In addition to increased cancer screening, surgeries may be offered or recommended to reduce cancer risk.  Recommendations are based upon an individual s genetic test result as well as their personal and family history of cancer.    Questions to Think About Regarding Genetic Testing:    What effect will the test result have on me and my relationship with my family members if I have an inherited gene mutation?  If I don t have a gene mutation?    Should I share my test results, and how will my family react to this news, which may also affect them?    Are my children ready to learn new information that may one day affect their own health?    Hereditary Cancer Resources    FORCE: Facing Our Risk of Cancer Empowered facingourrisk.org   Bright Pink bebrightpink.org   Li-Fraumeni Syndrome Association lfsassociation.org   PTEN World PTENworld.com   No stomach for cancer, Inc.  nostomachforcancer.org   Stomach cancer relief network Scrnet.org   Collaborative Group of the Americas on Inherited Colorectal Cancer (CGA) cgaicc.com    Cancer Care cancercare.org   American Cancer Society (ACS) cancer.org   National Cancer Lovelady (NCI) cancer.gov     Please call us if you have any questions or concerns.   Cancer Risk Management Program [phone]-CANCER ([phone])  ? Kevin Jennings, MS, [facility] [phone]  ? Lesley Daigle, MS, [facility]  [phone]  ? Trinh Hobson, MS, [facility]  [phone]  ? Elva Poole, MS, [facility] [phone]  ? Tammie Hillary, MS, [facility] [phone]  ? Kelsey Bravo, MS, [facility]  [phone]  ? Chanda Kaur, MS, [facility]  [phone]    References  1. Che SIMON, Taurus PDP, Luiz S, Eamon VILLALBA, Fiona JE, Genaro JL, Tamera N, Tiffanie H, Lucie O, Liss A, Albaroini B, Radirandall P, Manelizakikandice S, Dorinda DM, Johnson N, Brandon E, Nohemy H, Trent E, Nelly J, Grongeorgie J, Lonny B, Jous H, Thorlacius S, Eerola H, Nevkandicelinna H, Lanette K, Tita OP. Average risks of breast and ovarian cancer associated with BRCA1 or BRCA2 mutations detected in case series unselected for family history: a combined analysis of 222 studies. Am J Hum Jihan. 2003;72:1117-30.  2. Brenna N, Swapna M, Bacon G.  BRCA1 and BRCA2 Hereditary Breast and Ovarian Cancer. Gene Reviews online. 2013.  3. Norberto YC, Dora S, Rajendra G, Topete S. Breast cancer risk among male BRCA1 and BRCA2 mutation carriers. J Natl Cancer Inst. 2007;99:1811-4.  4. Siddharth PRUITT, Beverly I, Eloy J, David E, Lev ER, Yann F. Risk of breast cancer in male BRCA2 carriers. J Med Jihan. 2010;47:710-1.  5. National Comprehensive Cancer Network. Clinical practice guidelines in oncology, colorectal cancer screening. Available online (registration required). 2015.  6. Lino MONROE, Jem J, Svetlana J, Tami LA, Lisa MANDUJANO, Cristel C. Lifetime cancer risks in individuals with germline PTEN mutations. Clin Cancer Res. 2012;18:400-7.  7. Pilarski R. Cowden  Syndrome: A Critical Review of the Clinical Literature. J Jihan . 2009:18:13-27.  8. Vonnie A, Lawrence D, Bernadette S, Luana P, Salma T, Rose Marie M, Remi B, Hailee H, Lucian R, Baldemar K, Giovanni L, Siddharth DG, Dorinda D, Dillon DF, Nancy MR, The Breast Cancer Susceptibility Collaboration (UK) & Bambi FINE. CRISTHIAN mutations that cause ataxia-telangiectasia are breast cancer susceptibility alleles. Nature Genetics. 2006;38:873-875  9. Refugio N , Renee Y, Scarlett J, Romain L, Terrence GM , Vonda ML, Gallinger S, Blankenship AG, Syngal S, Gisella ML, Js J , Irene R, Michael SZ, Michael JR, Jan VE, Matt M, Vorubia B, Eden N, Lee RH, Rose Mary KW, and Darrel AP. CRISTHIAN mutations in patients with hereditary pancreatic cancer. Cancer Discover. 2012;2:41-46  10. Che MCLAUGHLIN, et al. Breast-Cancer Risk in Families with Mutations in PALB2. NEJM. 2014; 371(6):497-506.  11. CHEK2 Breast Cancer Case-Control Consortium. CHEK2*1100delC and susceptibility to breast cancer: A collaborative analysis involving 10,860 breast cancer cases and 9,065 controls from 10 studies. Am J Hum Jihan, 74 (2004), pp. 0941-3664  12. Osvaldo T, Fernando S, Delphine K, et al. Spectrum of Mutations in BRCA1, BRCA2, CHEK2, and TP53 in Families at High Risk of Breast Cancer. LIBERTY. 2006;295(12):6586-7839.   13. Gurjit C, Ramyond D, Don A, et al. Risk of breast cancer in women with a CHEK2 mutation with and without a family history of breast cancer. J Clin Oncol. 2011;29:7567-9869.  14. Zak H, Tyler E, Luis SJ, et al. Contribution of germline mutations in the RAD51B, RAD51C, and RAD51D genes to ovarian cancer in the population. J Clin Oncol. 2015;33(26):5633-1812. Doi:10.1200/JCO.2015.61.2408.  15. Vamsi T, Rosa GORDON, Elida P, et al. Mutations in BRIP1 confer high risk of ovarian cancer. Rima Jihan. 2011;43(11):4059-0131. doi:10.1038/ng.955.

## 2020-03-19 NOTE — LETTER
Cancer Risk Management  Program Locations    [facility]  [facility]  Mailing Address  Cancer Risk Management Program  [facility]  [address]  [place] [address]    New patient appointments  [phone]  March 26, 2020    Stephanie Bradshaw  [address] [address]      Dear Stephanie,    It was a pleasure speaking with you on the phone on 3/19/2020. Here is a copy of the progress note from our discussion. If you have any additional questions, please feel free to call.    Referring Provider: Julia Siddiqui MD    Presenting Information:   I spoke with Stephanie Bradshaw over the phone today for genetic counseling to discuss her family history of cancer. With her permission, this appointment was conducted over the phone due to COVID-19 precautions. We talked today to review this history, cancer screening recommendations, and available genetic testing options.    Personal History:  Stephanie is a 39 year old female. She does not have any personal history of cancer.    She had her first menstrual period at age 12 and is premenopausal. She does not have any children. Stephanie has her ovaries, fallopian tubes and uterus in place. She reports that she has not used hormone replacement therapy or infertility medications. She has used oral contraceptives, the Depo shot, and an IUD. She has not yet had any breast imaging or a colonoscopy due to her age. Stephanie reported former tobacco use for approximately 5 years and alcohol use of 2 drinks per week.    Family History: (Please see scanned pedigree for detailed family history information)  Maternal:  Her mother is 63 years old and has a history of multiple basal cell carcinomas (nose, face). She has also had a meningioma, that was treated with radiation, and a  pituitary tumor that was removed. She has also had a biopsy of a lung lesion that was benign.  Stephanie reports no known history of cancer in her four maternal uncles and one aunt, or in her cousins.   Her maternal grandfather was diagnosed with lymphoma and passed away at age 45.   Paternal:  Her father is 63 years old. He has a history of a brain tumor. She believes that this was benign. He has not had any genetic testing.   Her paternal aunt was diagnosed with breast cancer in her 20s and had a bilateral mastectomy and chemotherapy. She was then diagnosed with a second breast cancer in her 50s and had radiation. She then also had multiple other cancers in her 50s, possibly a colon cancer and a sarcoma. She had her treatments at [facility]. She passed away at age 56. She did undergo genetic testing and reportedly was found to have mutations in both the TP53 gene and the POLE gene. Stephanie will speak with her cousin to try to obtain a copy of these test results.   Her son (Stephanie's cousin) is 29 years old and has reportedly tested negative for the TP53 mutation. Her other son is 39 years old and has a history of skin cancer. He has not yet had genetic testing. Her daughter is 41 years old with no known history of cancer, and she has also not yet had genetic testing.   Another paternal aunt is in her late 60s and has a history of cervical cancer. She has not had genetic testing.  Her daughter (Stephanie's cousin) is 45 years old and was diagnosed with breast cancer in her 30s. She has reportedly had negative genetic testing.   Her paternal uncle is in his late 60s and has a history of prostate cancer at an unknown age. He has reportedly had negative genetic testing.  Her paternal grandfather was diagnosed with prostate cancer at an unknown age and passed away at age 90.     Her maternal ethnicity is English. Her paternal ethnicity is English. There is no known Ashkenazi Scientologist ancestry on either side of her family.      Discussion:    Stephanie's family history of cancer is suggestive of a hereditary cancer syndrome.    We reviewed the features of sporadic, familial, and hereditary cancers. She reports that her paternal aunt has tested positive for both a TP53 mutation and a POLE mutation. We discussed the importance of obtaining a copy of these test results for confirmation. She will speak to her cousins about this.     Based on the reported TP53 mutation in her aunt, we discussed Li Fraumeni syndrome. Li-Fraumeni syndrome (LFS) is caused by a mutation in the TP53 gene. Cancers associated with LFS include: sarcomas, breast cancer, brain cancer, leukemia, lymphoma, adrenocortical carcinoma, and others. The hallmark cancer of LFS is sarcoma, while the most frequent cancer is female breast. Individuals with LFS are at increased risk for developing multiple primary cancers in their lifetime.      We also discussed that the POLE gene is associated with an increased risk for colon polyps and colon cancer.     A detailed handout regarding the TP53 gene and the information we discussed was mailed to Stephanie at the end of our appointment today and can be found in the after visit summary. Topics included: inheritance pattern, cancer risks, cancer screening recommendations, and also risks, benefits and limitations of testing.    Based on her personal and family history, Stephanie meets current National Comprehensive Cancer Network (NCCN) criteria for genetic testing of high-penetrance breast and/or ovarian cancer susceptibility genes, which often includes BRCA1, BRCA2, CDH1, PALB2, PTEN, and TP53 among others.     Given that we do not have the genetic test results from her paternal aunt available at this time (and she is unsure if she will be able to get them) we discussed additional genes that could cause increased risk for breast, colon, prostate, and other cancers. Additionally, based on her mother's history of a meningioma and a pituitary  tumor, and her father's history of a likely benign brain tumor, we discussed genes associated with brain tumors. As many of these genes present with overlapping features in a family and accurate cancer risk cannot always be established based upon the pedigree analysis alone, it would be reasonable for Stephanie to consider panel genetic testing to analyze multiple genes at once.  We reviewed genetic testing options for the cancers seen in Stephanie s family history that suggest a hereditary cause: an actionable high/moderate risk gene custom panel or  an expanded custom panel. Stephanie expressed an interest in more broad testing. She opted for an expanded CustomNext-Cancer panel (a combination of the CancerNext panel + three additional genes associated with colon polyps + genes associated with brain tumors/cancer).  We discussed that many genes on this panel are associated with specific hereditary cancer syndromes and have published management guidelines. Other genes have medical management guidelines available to screen for certain cancers. The remaining genes are associated with increased cancer risk and may allow us to make medical recommendations when mutations are identified.    Due to COVID-19 precautions, this appointment was conducted entirely over the phone, therefore consent was obtained over the phone. This is indicated on the consent form, which also includes my signature (as the provider who obtained consent). Genetic testing via a CustomNext-Cancer panel (a combination of the CancerNext panel + three additional genes associated with colon polyps + genes associated with brain tumors/cancer) will be sent to [facility]. Stephanie opted to have a saliva sample collection kit shipped directly to her home from [facility] (this is estimated to arrive to her in 3-5 business days). She will ship the kit back to [facility] for analysis. Turnaround time from date when sample is received at the lab:  approximately 3-4 weeks.    Medical Management: For Stephanie, we reviewed that the information from genetic testing may determine:    additional cancer screening for which Stephanie may qualify (i.e. mammogram and breast MRI, more frequent colonoscopies, more frequent dermatologic exams, etc.),    options for risk reducing surgeries Stephanie could consider (i.e. bilateral mastectomy, surgery to remove her ovaries and/or uterus, etc.),      and targeted chemotherapies if she were to develop certain cancers in the future (i.e. immunotherapy for individuals with Lira syndrome, PARP inhibitors, etc.).     These recommendations will be discussed in detail once genetic testing is completed.     Plan:  1) Today Stephanie elected to proceed with genetic testing via a CustomNext-Cancer panel (a combination of the CancerNext panel + three additional genes associated with colon polyps + genes associated with brain tumors/cancer) offered by [facility].  2) This information should be available in 5-6 weeks.  3) Stephanie will receive a phone call to discuss the results.    Chanda Kaur MS, [facility]  Licensed Genetic Counselor  Office: [phone]

## 2020-03-19 NOTE — PROGRESS NOTES
3/19/2020    Referring Provider: Julia Siddiqui MD    Presenting Information:   I spoke with Stephanie Bradshaw over the phone today for genetic counseling to discuss her family history of cancer. With her permission, this appointment was conducted over the phone due to COVID-19 precautions. We talked today to review this history, cancer screening recommendations, and available genetic testing options.    Personal History:  Stephanie is a 39 year old female. She does not have any personal history of cancer.    She had her first menstrual period at age 12 and is premenopausal. She does not have any children. Stephanie has her ovaries, fallopian tubes and uterus in place. She reports that she has not used hormone replacement therapy or infertility medications. She has used oral contraceptives, the Depo shot, and an IUD. She has not yet had any breast imaging or a colonoscopy due to her age. Stephanie reported former tobacco use for approximately 5 years and alcohol use of 2 drinks per week.    Family History: (Please see scanned pedigree for detailed family history information)  Maternal:  Her mother is 63 years old and has a history of multiple basal cell carcinomas (nose, face). She has also had a meningioma, that was treated with radiation, and a pituitary tumor that was removed. She has also had a biopsy of a lung lesion that was benign.  Stephanie reports no known history of cancer in her four maternal uncles and one aunt, or in her cousins.   Her maternal grandfather was diagnosed with lymphoma and passed away at age 45.   Paternal:  Her father is 63 years old. He has a history of a brain tumor. She believes that this was benign. He has not had any genetic testing.   Her paternal aunt was diagnosed with breast cancer in her 20s and had a bilateral mastectomy and chemotherapy. She was then diagnosed with a second breast cancer in her 50s and had radiation. She then also had multiple other cancers in her 50s, possibly a  colon cancer and a sarcoma. She had her treatments at [facility]. She passed away at age 56. She did undergo genetic testing and reportedly was found to have mutations in both the TP53 gene and the POLE gene. Stephanie will speak with her cousin to try to obtain a copy of these test results.   Her son (Stephanie's cousin) is 29 years old and has reportedly tested negative for the TP53 mutation. Her other son is 39 years old and has a history of skin cancer. He has not yet had genetic testing. Her daughter is 41 years old with no known history of cancer, and she has also not yet had genetic testing.   Another paternal aunt is in her late 60s and has a history of cervical cancer. She has not had genetic testing.  Her daughter (Stephanie's cousin) is 45 years old and was diagnosed with breast cancer in her 30s. She has reportedly had negative genetic testing.   Her paternal uncle is in his late 60s and has a history of prostate cancer at an unknown age. He has reportedly had negative genetic testing.  Her paternal grandfather was diagnosed with prostate cancer at an unknown age and passed away at age 90.     Her maternal ethnicity is English. Her paternal ethnicity is English. There is no known Ashkenazi [employer] ancestry on either side of her family.     Discussion:    Stephanie's family history of cancer is suggestive of a hereditary cancer syndrome.    We reviewed the features of sporadic, familial, and hereditary cancers. She reports that her paternal aunt has tested positive for both a TP53 mutation and a POLE mutation. We discussed the importance of obtaining a copy of these test results for confirmation. She will speak to her cousins about this.     Based on the reported TP53 mutation in her aunt, we discussed Li Fraumeni syndrome. Li-Fraumeni syndrome (LFS) is caused by a mutation in the TP53 gene. Cancers associated with LFS include: sarcomas, breast cancer, brain cancer, leukemia, lymphoma, adrenocortical carcinoma, and  others. The hallmark cancer of LFS is sarcoma, while the most frequent cancer is female breast. Individuals with LFS are at increased risk for developing multiple primary cancers in their lifetime.      We also discussed that the POLE gene is associated with an increased risk for colon polyps and colon cancer.     A detailed handout regarding the TP53 gene and the information we discussed was mailed to Stephanie at the end of our appointment today and can be found in the after visit summary. Topics included: inheritance pattern, cancer risks, cancer screening recommendations, and also risks, benefits and limitations of testing.    Based on her personal and family history, Stephanie meets current National Comprehensive Cancer Network (NCCN) criteria for genetic testing of high-penetrance breast and/or ovarian cancer susceptibility genes, which often includes BRCA1, BRCA2, CDH1, PALB2, PTEN, and TP53 among others.     Given that we do not have the genetic test results from her paternal aunt available at this time (and she is unsure if she will be able to get them) we discussed additional genes that could cause increased risk for breast, colon, prostate, and other cancers. Additionally, based on her mother's history of a meningioma and a pituitary tumor, and her father's history of a likely benign brain tumor, we discussed genes associated with brain tumors. As many of these genes present with overlapping features in a family and accurate cancer risk cannot always be established based upon the pedigree analysis alone, it would be reasonable for Stephanie to consider panel genetic testing to analyze multiple genes at once.  We reviewed genetic testing options for the cancers seen in Stephanie s family history that suggest a hereditary cause: an actionable high/moderate risk gene custom panel or  an expanded custom panel. Stephanie expressed an interest in more broad testing. She opted for an expanded CustomNext-Cancer panel (a  combination of the CancerNext panel + three additional genes associated with colon polyps + genes associated with brain tumors/cancer).  We discussed that many genes on this panel are associated with specific hereditary cancer syndromes and have published management guidelines. Other genes have medical management guidelines available to screen for certain cancers. The remaining genes are associated with increased cancer risk and may allow us to make medical recommendations when mutations are identified.    Due to COVID-19 precautions, this appointment was conducted entirely over the phone, therefore consent was obtained over the phone. This is indicated on the consent form, which also includes my signature (as the provider who obtained consent). Genetic testing via a CustomNext-Cancer panel (a combination of the CancerNext panel + three additional genes associated with colon polyps + genes associated with brain tumors/cancer) will be sent to [facility]. Stephanie opted to have a saliva sample collection kit shipped directly to her home from [facility] (this is estimated to arrive to her in 3-5 business days). She will ship the kit back to [facility] for analysis. Turnaround time from date when sample is received at the lab: approximately 3-4 weeks.    Medical Management: For Stephanie, we reviewed that the information from genetic testing may determine:    additional cancer screening for which Stephanie may qualify (i.e. mammogram and breast MRI, more frequent colonoscopies, more frequent dermatologic exams, etc.),    options for risk reducing surgeries Stephanie could consider (i.e. bilateral mastectomy, surgery to remove her ovaries and/or uterus, etc.),      and targeted chemotherapies if she were to develop certain cancers in the future (i.e. immunotherapy for individuals with Lira syndrome, PARP inhibitors, etc.).     These recommendations will be discussed in detail once genetic testing is completed.      Plan:  1) Today Stephanie elected to proceed with genetic testing via a CustomNext-Cancer panel (a combination of the CancerNext panel + three additional genes associated with colon polyps + genes associated with brain tumors/cancer) offered by [facility].  2) This information should be available in 5-6 weeks.  3) Stephanie will receive a phone call to discuss the results.    Time spent over the phone: 50 minutes    Chanda Kaur MS, [facility]  Licensed Genetic Counselor  Office: [phone]

## 2020-03-26 DIAGNOSIS — Z80.42 FAMILY HISTORY OF PROSTATE CANCER: ICD-10-CM

## 2020-03-26 DIAGNOSIS — Z84.89 FAMILY HISTORY OF BRAIN TUMOR: ICD-10-CM

## 2020-03-26 DIAGNOSIS — Z84.81 FAMILY HISTORY OF GENE MUTATION: ICD-10-CM

## 2020-03-26 DIAGNOSIS — Z80.3 FAMILY HISTORY OF MALIGNANT NEOPLASM OF BREAST: ICD-10-CM

## 2020-04-10 LAB — MISCELLANEOUS TEST: NORMAL

## 2020-04-13 LAB — LAB SCANNED RESULT: NORMAL

## 2020-04-21 ENCOUNTER — VIRTUAL VISIT (OUTPATIENT)
Dept: ONCOLOGY | Facility: CLINIC | Age: 40
End: 2020-04-21
Attending: GENETIC COUNSELOR, MS
Payer: COMMERCIAL

## 2020-04-21 DIAGNOSIS — Z80.3 FAMILY HISTORY OF MALIGNANT NEOPLASM OF BREAST: Primary | ICD-10-CM

## 2020-04-21 NOTE — PROGRESS NOTES
"4/21/2020    Referring Provider: Julia Siddiqui MD    Presenting Information:  I spoke to Stephanie by phone today to discuss her genetic testing results. A CustomNext-Cancer panel (a combination of the CancerNext panel + three additional genes associated with colon polyps + genes associated with brain tumors/cancer) was ordered from [facility]. This testing was done because of Stepahnie's family history of cancer.    Genetic Testing Result: NEGATIVE  Stephanie is negative for mutations in the 52 genes analyzed: AIP, ALK, APC, CRISTHIAN, AXIN2, BARD1, BMPR1A, BRCA1, BRCA2, BRIP1, CDH1, CDK4, CDKN1B, CDKN2A, CHEK2, DICER1, HOXB13, MEN1, MLH1, MRE11A, MSH2, MSH3, MSH6, MUTYH, NBN, NF1, NF2, NTHL1, PALB2, PHOX2B, PMS2, POLD1, POLE, POT1, BUTBA5D, PTCH1, PTEN, RAD50, RAD51C, RAD51D, SMAD4, SMARCA4, SMARCB1, SMARCE1, STK11, SUFU, TP53, TSC1, TSC2 and VHL (sequencing and deletion/duplication); EPCAM and GREM1 (deletion/duplication only).     Of note, no mutations were detected in the TP53 or POLE genes. She previously reported that her paternal aunt was found to have mutations in each of these genes, although she has been unable to obtain a copy of these test results from her family members. While it is reassuring that no mutations were detected in either of these genes in Stephanie, she is still encouraged to contact me if she is able to obtain a copy of her aunt's test results. This would allow for confirmation with [facility] that the familial mutations were not detected in Stephanie.       A copy of the test report can be found in the Laboratory tab, dated 3/26/20, and named \"[facility]\". The report is scanned in as a linked document.    Interpretation:  We discussed several different interpretations of this negative test result.    1. One explanation may be that there is a different gene or combination of genes and environment that are associated with the cancers in this family.  2. It is possible that her " relatives have a mutation in one of these genes and she did not inherit it. As described above, she did report TP53 and POLE mutations in her paternal aunt.  3. There is also a small possibility that there is a mutation in one of these genes, and the testing laboratory could not find it with their current testing methods.       Screening:  Based on this negative test result, it is important for Stephanie and her relatives to refer back to the family history for appropriate cancer screening.      Based on the personal and family history information she provided, Stephanie does not meet current National Comprehensive Cancer Network (NCCN) guidelines for high risk breast screening based on the GRICELDA Risk Evaluator v8 model. As such, Stephanie should continue with routine breast imaging. In addition, Stephanie should be receiving clinical breast exams by her physician. Her risks may change over time (due to personal or family history factors), therefore, she is encouraged to keep in touch with me to discuss breast cancer screening options in the future.     She should also continue with routine gynecologic exams, skin exams, and colonoscopy screening. Other population cancer screening options, such as those recommended by the American Cancer Society and the National Comprehensive Cancer Network (NCCN), are also appropriate for Stephanie and her family. These screening recommendations may change if there are changes to Stephanie's personal and/or family history of cancer. Final screening recommendations should be made by each individual's managing physician.    Inheritance:  We reviewed the autosomal dominant inheritance of mutations in these genes. We discussed that Stephanie cannot pass on an identifiable mutation in these genes to any future children based on this test result. Mutations in these genes do not skip generations.      Additional Testing Considerations:  Although Stephanie's genetic testing result was negative, other relatives may  still carry a gene mutation associated with an increased risk for cancer. Genetic counseling is recommended for her father and paternal relatives to discuss genetic testing options. If any of these relatives do pursue genetic testing, Stephanie is encouraged to contact me so that we may review the impact of their test results on her.    Summary:  We do not have an explanation for Stephanie's family history of cancer. While no genetic changes were identified, Stephanie may still be at risk for certain cancers due to family history, environmental factors, or other genetic causes not identified by this test.  Because of that, it is important that she continue with cancer screening based on her personal and family history as discussed above.    Genetic testing is rapidly advancing, and new cancer susceptibility genes will most likely be identified in the future.  Therefore, I encouraged Stephanie to contact me annually or if there are changes in her personal or family history.  This may change how we assess her cancer risk, screening, and the testing we would offer.    Plan:  1. A copy of the test results will be mailed to Stephanie.  2. She plans to follow-up with her physicians.  3. She should contact me annually, or sooner if her family history changes.    If Stephanie has any further questions, I encouraged her to contact me at [phone].    Time spent on the phone: 5 minutes.    Chanda Kaur MS, [facility]  Licensed Genetic Counselor  Office: [phone]

## 2020-04-21 NOTE — Clinical Note
Please send copy of letter to patient with test results. Please enclose test results: Send outs misc test [[id number]] (Order [id number])

## 2020-04-21 NOTE — LETTER
Cancer Risk Management  Program [facility]  [facility]  [facility]  Mailing Address  Cancer Risk Management Program  [address]  [place] [address]    New patient appointments  [phone]  June 17, 2020    Stephanie Bradshaw  [address] [address]      Dear Stephanie,    It was a pleasure speaking with you on the phone on 4/21/2020. Here is a copy of the progress note from our discussion. If you have any additional questions, please feel free to call.    Referring Provider: Julia Siddiqui MD    Presenting Information:  I spoke to Stephanie by phone today to discuss her genetic testing results. A CustomNext-Cancer panel (a combination of the CancerNext panel + three additional genes associated with colon polyps + genes associated with brain tumors/cancer) was ordered from [facility]. This testing was done because of Stephanie's family history of cancer.    Genetic Testing Result: NEGATIVE  Stephanie is negative for mutations in the 52 genes analyzed: AIP, ALK, APC, CRISTHIAN, AXIN2, BARD1, BMPR1A, BRCA1, BRCA2, BRIP1, CDH1, CDK4, CDKN1B, CDKN2A, CHEK2, DICER1, HOXB13, MEN1, MLH1, MRE11A, MSH2, MSH3, MSH6, MUTYH, NBN, NF1, NF2, NTHL1, PALB2, PHOX2B, PMS2, POLD1, POLE, POT1, UCBYX9F, PTCH1, PTEN, RAD50, RAD51C, RAD51D, SMAD4, SMARCA4, SMARCB1, SMARCE1, STK11, SUFU, TP53, TSC1, TSC2 and VHL (sequencing and deletion/duplication); EPCAM and GREM1 (deletion/duplication only).     Of note, no mutations were detected in the TP53 or POLE genes. She previously reported that her paternal aunt was found to have mutations in each of these genes, although she has been unable to obtain a copy of these test results from her family members. While it is reassuring that no mutations were detected in either of  these genes in Stephanie, she is still encouraged to contact me if she is able to obtain a copy of her aunt's test results. This would allow for confirmation with [facility] that the familial mutations were not detected in Stephanie.       Interpretation:  We discussed several different interpretations of this negative test result.    1. One explanation may be that there is a different gene or combination of genes and environment that are associated with the cancers in this family.  2. It is possible that her relatives have a mutation in one of these genes and she did not inherit it. As described above, she did report TP53 and POLE mutations in her paternal aunt.  3. There is also a small possibility that there is a mutation in one of these genes, and the testing laboratory could not find it with their current testing methods.       Screening:  Based on this negative test result, it is important for Stephanie and her relatives to refer back to the family history for appropriate cancer screening.      Based on the personal and family history information she provided, Stephanie does not meet current National Comprehensive Cancer Network (NCCN) guidelines for high risk breast screening based on the GRICELDA Risk Evaluator v8 model. As such, Stephanie should continue with routine breast imaging. In addition, Stephanie should be receiving clinical breast exams by her physician. Her risks may change over time (due to personal or family history factors), therefore, she is encouraged to keep in touch with me to discuss breast cancer screening options in the future.     She should also continue with routine gynecologic exams, skin exams, and colonoscopy screening. Other population cancer screening options, such as those recommended by the American Cancer Society and the National Comprehensive Cancer Network (NCCN), are also appropriate for Stephanie and her family. These screening recommendations may change if there are changes to Stephanie's  personal and/or family history of cancer. Final screening recommendations should be made by each individual's managing physician.    Inheritance:  We reviewed the autosomal dominant inheritance of mutations in these genes. We discussed that Stephanie cannot pass on an identifiable mutation in these genes to any future children based on this test result. Mutations in these genes do not skip generations.      Additional Testing Considerations:  Although Stephanie's genetic testing result was negative, other relatives may still carry a gene mutation associated with an increased risk for cancer. Genetic counseling is recommended for her father and paternal relatives to discuss genetic testing options. If any of these relatives do pursue genetic testing, Stephanie is encouraged to contact me so that we may review the impact of their test results on her.    Summary:  We do not have an explanation for Stephanie's family history of cancer. While no genetic changes were identified, Stephanie may still be at risk for certain cancers due to family history, environmental factors, or other genetic causes not identified by this test.  Because of that, it is important that she continue with cancer screening based on her personal and family history as discussed above.    Genetic testing is rapidly advancing, and new cancer susceptibility genes will most likely be identified in the future.  Therefore, I encouraged Stephanie to contact me annually or if there are changes in her personal or family history.  This may change how we assess her cancer risk, screening, and the testing we would offer.    Plan:  1. A copy of the test results will be mailed to Stephanie.  2. She plans to follow-up with her physicians.  3. She should contact me annually, or sooner if her family history changes.    If Stephanie has any further questions, I encouraged her to contact me at [phone].    Chanda Kaur MS, [facility]  Licensed Genetic Counselor  Office:  [phone]

## 2020-07-14 ENCOUNTER — MEDICAL CORRESPONDENCE (OUTPATIENT)
Dept: HEALTH INFORMATION MANAGEMENT | Facility: CLINIC | Age: 40
End: 2020-07-14

## 2020-07-29 DIAGNOSIS — E28.9 OVARIAN DYSFUNCTION: Primary | ICD-10-CM

## 2020-07-29 LAB
B-HCG SERPL-ACNC: <1 IU/L (ref 0–5)
ESTRADIOL SERPL-MCNC: 140 PG/ML
FSH SERPL-ACNC: 4.7 IU/L
LH SERPL-ACNC: 8.7 IU/L
PROGEST SERPL-MCNC: 0.4 NG/ML
TSH SERPL DL<=0.005 MIU/L-ACNC: 0.82 MU/L (ref 0.4–4)

## 2020-07-31 LAB — MIS SERPL-MCNC: 0.18 NG/ML (ref 0.18–11.71)

## 2020-08-13 DIAGNOSIS — E28.9 OVARIAN DYSFUNCTION: Primary | ICD-10-CM

## 2020-08-13 LAB
ESTRADIOL SERPL-MCNC: 33 PG/ML
FSH SERPL-ACNC: 10.7 IU/L
LH SERPL-ACNC: 4.1 IU/L
PROGEST SERPL-MCNC: 0.5 NG/ML
TSH SERPL DL<=0.005 MIU/L-ACNC: 0.99 MU/L (ref 0.4–4)

## 2020-08-16 LAB — MIS SERPL-MCNC: 0.67 NG/ML (ref 0.18–11.71)

## 2020-09-17 ENCOUNTER — HOSPITAL PATHOLOGY (OUTPATIENT)
Dept: OTHER | Facility: CLINIC | Age: 40
End: 2020-09-17

## 2020-09-20 DIAGNOSIS — N94.3 PREMENSTRUAL SYNDROME: ICD-10-CM

## 2020-09-20 DIAGNOSIS — E03.9 HYPOTHYROIDISM, UNSPECIFIED TYPE: ICD-10-CM

## 2020-09-21 LAB — COPATH REPORT: NORMAL

## 2020-09-23 RX ORDER — LEVOTHYROXINE SODIUM 100 MCG
100 TABLET ORAL DAILY
Qty: 30 TABLET | Refills: 0 | Status: SHIPPED | OUTPATIENT
Start: 2020-09-23 | End: 2020-10-21

## 2020-10-21 ENCOUNTER — OFFICE VISIT (OUTPATIENT)
Dept: FAMILY MEDICINE | Facility: CLINIC | Age: 40
End: 2020-10-21
Payer: COMMERCIAL

## 2020-10-21 VITALS
HEIGHT: 64 IN | BODY MASS INDEX: 31.45 KG/M2 | WEIGHT: 184.2 LBS | DIASTOLIC BLOOD PRESSURE: 72 MMHG | HEART RATE: 103 BPM | OXYGEN SATURATION: 94 % | TEMPERATURE: 97.2 F | SYSTOLIC BLOOD PRESSURE: 124 MMHG

## 2020-10-21 DIAGNOSIS — E03.9 HYPOTHYROIDISM, UNSPECIFIED TYPE: ICD-10-CM

## 2020-10-21 DIAGNOSIS — Z13.1 SCREENING FOR DIABETES MELLITUS: ICD-10-CM

## 2020-10-21 DIAGNOSIS — Z13.220 SCREENING FOR HYPERLIPIDEMIA: ICD-10-CM

## 2020-10-21 DIAGNOSIS — N94.3 PREMENSTRUAL SYNDROME: ICD-10-CM

## 2020-10-21 DIAGNOSIS — Z00.00 ROUTINE GENERAL MEDICAL EXAMINATION AT A HEALTH CARE FACILITY: Primary | ICD-10-CM

## 2020-10-21 DIAGNOSIS — Z13.0 SCREENING FOR IRON DEFICIENCY ANEMIA: ICD-10-CM

## 2020-10-21 DIAGNOSIS — N63.10 BREAST MASS, RIGHT: ICD-10-CM

## 2020-10-21 LAB — HGB BLD-MCNC: 13.9 G/DL (ref 11.7–15.7)

## 2020-10-21 PROCEDURE — 36415 COLL VENOUS BLD VENIPUNCTURE: CPT | Performed by: NURSE PRACTITIONER

## 2020-10-21 PROCEDURE — 82947 ASSAY GLUCOSE BLOOD QUANT: CPT | Performed by: NURSE PRACTITIONER

## 2020-10-21 PROCEDURE — 99395 PREV VISIT EST AGE 18-39: CPT | Performed by: NURSE PRACTITIONER

## 2020-10-21 PROCEDURE — 85018 HEMOGLOBIN: CPT | Performed by: NURSE PRACTITIONER

## 2020-10-21 PROCEDURE — 99214 OFFICE O/P EST MOD 30 MIN: CPT | Mod: 25 | Performed by: NURSE PRACTITIONER

## 2020-10-21 PROCEDURE — 87624 HPV HI-RISK TYP POOLED RSLT: CPT | Performed by: NURSE PRACTITIONER

## 2020-10-21 PROCEDURE — 84443 ASSAY THYROID STIM HORMONE: CPT | Performed by: NURSE PRACTITIONER

## 2020-10-21 PROCEDURE — G0145 SCR C/V CYTO,THINLAYER,RESCR: HCPCS | Performed by: NURSE PRACTITIONER

## 2020-10-21 PROCEDURE — 80061 LIPID PANEL: CPT | Performed by: NURSE PRACTITIONER

## 2020-10-21 RX ORDER — LEVOTHYROXINE SODIUM 100 MCG
100 TABLET ORAL DAILY
Qty: 90 TABLET | Refills: 3 | Status: SHIPPED | OUTPATIENT
Start: 2020-10-21 | End: 2021-10-07

## 2020-10-21 ASSESSMENT — ENCOUNTER SYMPTOMS
MYALGIAS: 0
SHORTNESS OF BREATH: 0
CONSTIPATION: 0
NERVOUS/ANXIOUS: 0
JOINT SWELLING: 0
FEVER: 0
DIARRHEA: 0
WEAKNESS: 0
EYE PAIN: 0
HEARTBURN: 0
ABDOMINAL PAIN: 0
COUGH: 0
PALPITATIONS: 0
DIZZINESS: 0
HEMATOCHEZIA: 0
CHILLS: 0
BREAST MASS: 1
HEMATURIA: 0
FREQUENCY: 0
NAUSEA: 0
HEADACHES: 0
DYSURIA: 0
PARESTHESIAS: 0
SORE THROAT: 0
ARTHRALGIAS: 0

## 2020-10-21 ASSESSMENT — ANXIETY QUESTIONNAIRES
IF YOU CHECKED OFF ANY PROBLEMS ON THIS QUESTIONNAIRE, HOW DIFFICULT HAVE THESE PROBLEMS MADE IT FOR YOU TO DO YOUR WORK, TAKE CARE OF THINGS AT HOME, OR GET ALONG WITH OTHER PEOPLE: NOT DIFFICULT AT ALL
5. BEING SO RESTLESS THAT IT IS HARD TO SIT STILL: NOT AT ALL
2. NOT BEING ABLE TO STOP OR CONTROL WORRYING: NOT AT ALL
1. FEELING NERVOUS, ANXIOUS, OR ON EDGE: NOT AT ALL
3. WORRYING TOO MUCH ABOUT DIFFERENT THINGS: NOT AT ALL
7. FEELING AFRAID AS IF SOMETHING AWFUL MIGHT HAPPEN: NOT AT ALL
GAD7 TOTAL SCORE: 1
6. BECOMING EASILY ANNOYED OR IRRITABLE: SEVERAL DAYS

## 2020-10-21 ASSESSMENT — MIFFLIN-ST. JEOR: SCORE: 1495.53

## 2020-10-21 ASSESSMENT — PATIENT HEALTH QUESTIONNAIRE - PHQ9
SUM OF ALL RESPONSES TO PHQ QUESTIONS 1-9: 0
5. POOR APPETITE OR OVEREATING: NOT AT ALL

## 2020-10-21 NOTE — PROGRESS NOTES
SUBJECTIVE:   CC: Stephanie Bradshaw is an 39 year old woman who presents for preventive health visit.     Patient has been advised of split billing requirements and indicates understanding: Yes  Healthy Habits:     Getting at least 3 servings of Calcium per day:  Yes    Bi-annual eye exam:  Yes    Dental care twice a year:  Yes    Sleep apnea or symptoms of sleep apnea:  None    Diet:  Vegetarian/vegan    Frequency of exercise:  None    Taking medications regularly:  Yes    Medication side effects:  None    PHQ-2 Total Score: 0    Additional concerns today:  No      Today's PHQ-2 Score:   PHQ-2 (  [employer]) 10/21/2020   Q1: Little interest or pleasure in doing things 0   Q2: Feeling down, depressed or hopeless 0   PHQ-2 Score 0   Q1: Little interest or pleasure in doing things Not at all   Q2: Feeling down, depressed or hopeless Not at all   PHQ-2 Score 0     Abuse: Current or Past (Physical, Sexual or Emotional) - No  Do you feel safe in your environment? Yes      Social History     Tobacco Use     Smoking status: Former Smoker     Packs/day: 0.00     Years: 0.00     Pack years: 0.00     Types: Cigarettes     Quit date: 2009     Years since quittin.7     Smokeless tobacco: Never Used   Substance Use Topics     Alcohol use: Yes     Comment: rarely     If you drink alcohol do you typically have >3 drinks per day or >7 drinks per week? No    Alcohol Use 10/21/2020   Prescreen: >3 drinks/day or >7 drinks/week? No   Prescreen: >3 drinks/day or >7 drinks/week? -   No flowsheet data found.    Reviewed orders with patient.  Reviewed health maintenance and updated orders accordingly - Yes  Lab work is in process  Labs reviewed in EPIC  BP Readings from Last 3 Encounters:   10/21/20 124/72   20 120/81   20 120/88    Wt Readings from Last 3 Encounters:   10/21/20 83.6 kg (184 lb 3.2 oz)   20 88.5 kg (195 lb)   19 84.8 kg (187 lb)              Patient Active Problem List   Diagnosis      GERD (gastroesophageal reflux disease)     Asthma in adult, mild intermittent, uncomplicated     Past Surgical History:   Procedure Laterality Date     ABDOMEN SURGERY      Laparoscopy     GYN SURGERY      hysteroscopy     HC TOOTH EXTRACTION W/FORCEP         Social History     Tobacco Use     Smoking status: Former Smoker     Packs/day: 0.00     Years: 0.00     Pack years: 0.00     Types: Cigarettes     Quit date: 2009     Years since quittin.7     Smokeless tobacco: Never Used   Substance Use Topics     Alcohol use: Yes     Comment: rarely     Family History   Problem Relation Age of Onset     Cancer Maternal Grandfather      Other Cancer Maternal Grandfather         Lymphoma     Cancer Mother         menangiomna     Depression Mother      Thyroid Disease Mother      Other Cancer Mother         Bladder     Anxiety Disorder Mother      Alcohol/Drug Father      Substance Abuse Father      Depression Niece      Anxiety Disorder Niece      Anxiety Disorder Sister      Asthma Niece      Thyroid Disease Sister      Breast Cancer Paternal Aunt 20        diagnosed in 20s,  of breast cancer 30 years later.     Glaucoma No family hx of      Macular Degeneration No family hx of          Current Outpatient Medications   Medication Sig Dispense Refill     albuterol (PROAIR HFA/PROVENTIL HFA/VENTOLIN HFA) 108 (90 Base) MCG/ACT inhaler Inhale 2 puffs into the lungs       fluticasone (FLONASE) 50 MCG/ACT nasal spray Spray 2 sprays into both nostrils daily 1 spray daily       montelukast (SINGULAIR) 10 MG tablet Take 1 tablet (10 mg) by mouth At Bedtime 90 tablet 3     sertraline (ZOLOFT) 50 MG tablet Take 1 tablet (50 mg) by mouth daily DUE FOR APPOINTMENT 2020-NO FURTHER REFILLS (Patient taking differently: Take 75 mg by mouth daily DUE FOR APPOINTMENT 2020-NO FURTHER REFILLS) 30 tablet 0     SYNTHROID 100 MCG tablet Take 1 tablet (100 mcg) by mouth daily DUE FOR APPOINTMENT September  2020-NO FURTHER REFILLS 30 tablet 0     No Known Allergies  Recent Labs   Lab Test 08/13/20  1614 07/29/20  0659 10/31/17  0000 10/31/17   LDL  --   --   --  100   HDL  --   --   --  45   TRIG  --   --   --  120   TSH 0.99 0.82   < > 2.31    < > = values in this interval not displayed.        Mammogram not appropriate for this patient based on age.    Pertinent mammograms are reviewed under the imaging tab.  History of abnormal Pap smear: Last 3 Pap and HPV Results:       Reviewed and updated as needed this visit by clinical staff  Tobacco   Meds              Reviewed and updated as needed this visit by Provider                  Sertraline:  Since 2013 for PMS.  Taking it daily.  No side effects or problems.  It works well.  She trialed going off of it once and her PMS was terrible.  She is requesting refills today.    In INF treatment.  [facility] (out of [place]).    Asthma:  History of.  Singulair previously, but she has not taken it for a while.  Sleep study a few years ago, essentially normal.  Occassionaly she uses Flonase nasal spray.  She does not use the albuterol inhaler.  She feels that her asthma is very well controlled.    Hypothyroid:  Since 2013.  She has been on this same dose since she started.  She is due a recheck/refills today.     Right breast:  Consistent tenderness to right upper outer quadrant.  The pain does not change with her menstrual cycle.      Review of Systems   Constitutional: Negative for chills and fever.   HENT: Negative for congestion, ear pain, hearing loss and sore throat.    Eyes: Negative for pain and visual disturbance.   Respiratory: Negative for cough and shortness of breath.    Cardiovascular: Negative for chest pain, palpitations and peripheral edema.   Gastrointestinal: Negative for abdominal pain, constipation, diarrhea, heartburn, hematochezia and nausea.   Breasts:  Positive for tenderness and breast mass. Negative for discharge.   Genitourinary:  "Negative for dysuria, frequency, genital sores, hematuria, pelvic pain, urgency, vaginal bleeding and vaginal discharge.   Musculoskeletal: Negative for arthralgias, joint swelling and myalgias.   Skin: Negative for rash.   Neurological: Negative for dizziness, weakness, headaches and paresthesias.   Psychiatric/Behavioral: Negative for mood changes. The patient is not nervous/anxious.         OBJECTIVE:   /72 (BP Location: Left arm, Patient Position: Sitting, Cuff Size: Adult Regular)   Pulse 103   Temp 97.2  F (36.2  C) (Temporal)   Ht 1.626 m (5' 4\")   Wt 83.6 kg (184 lb 3.2 oz)   LMP 10/20/2020 (Exact Date)   SpO2 94%   BMI 31.62 kg/m    Physical Exam  GENERAL: healthy, alert and no distress  EYES: Eyes grossly normal to inspection, PERRL and conjunctivae and sclerae normal  HENT: ear canals and TM's normal, nose and mouth without ulcers or lesions  NECK: no adenopathy, no asymmetry, masses, or scars and thyroid normal to palpation  RESP: lungs clear to auscultation - no rales, rhonchi or wheezes  BREAST: left breast is normal without masses, tenderness or nipple discharge and no palpable axillary masses or adenopathy.  In her right breast there is definitely tender thickened area, approximate 1\"diameter to the 11 o'clock position 2\" from the areola.  No nipple discharge, no axillary or epitrochlear lymphadenopathy.   CV: regular rate and rhythm, normal S1 S2, no S3 or S4, no murmur, click or rub, no peripheral edema and peripheral pulses strong  ABDOMEN: soft, nontender, no hepatosplenomegaly, no masses and bowel sounds normal  : : External Genitalia: WNL .  Bartholin's/Urethral Meatus/[place]'s (BUS): WNL/Negative   Bladder: WNL   Vagina: Normal mucosa, no discharge and rugated   Cervix: healthy  Uterus: Normal shape, position and consistency, nontender   Adnexae: Normal without masses or tenderness,bilaterally   Anus/Perineum: Intact    MS: no gross musculoskeletal defects noted, no " edema  SKIN: no suspicious lesions or rashes  NEURO: Normal strength and tone, mentation intact and speech normal  PSYCH: mentation appears normal, affect normal/bright    Diagnostic Test Results:  Labs reviewed in Epic  Labs ordered, results pending.  Diagnostic mammo ordered    ASSESSMENT/PLAN:   (Z00.00) Routine general medical examination at a health care facility  (primary encounter diagnosis)  Comment:   Plan: Pap imaged thin layer screen with HPV -         recommended age 30 - 65, HPV High Risk Types         DNA Cervical, Lipid panel reflex to direct LDL         Fasting, Hemoglobin, Glucose, TSH with free T4         reflex        Done today    (N94.3) Premenstrual syndrome  Comment: chronic  Plan: sertraline (ZOLOFT) 50 MG tablet        Refills given.  Continue to take meds as prescribed.  Yearly clinic appts for refills, sooner prn.     (E03.9) Hypothyroidism, unspecified type  Comment: uncertain  Plan: SYNTHROID 100 MCG tablet, TSH with free T4         reflex        I anticipate her TSH will be normal today. Her current dose of levothyroxine was refills.  If her tsh is abnormal, her levothyroxine will be adjusted.  Follow up with me yearly for rechecks, sooner if today's tsh is abnormal.    (N63.10) Breast mass, right  Comment: etiology uncertain  OFFICE/OUTPT VISIT,TRUNG BARILLAS IV, MA Diagnostic         Digital Bilateral, US Breast F/U MRI Right         Limited 1-3 Quad  Plan: diagnostic mammogram with US if indicated at earliest convenience.      (Z13.1) Screening for diabetes mellitus  Comment:   Plan: Glucose        Done today    (Z13.0) Screening for iron deficiency anemia  Comment:   Plan: Hemoglobin       Done today    (Z13.220) Screening for hyperlipidemia  Comment:   Plan: Lipid panel reflex to direct LDL Fasting        Done today      Patient has been advised of split billing requirements and indicates understanding: Yes  COUNSELING:  Reviewed preventive health counseling, as reflected in patient  "instructions    Estimated body mass index is 31.62 kg/m  as calculated from the following:    Height as of this encounter: 1.626 m (5' 4\").    Weight as of this encounter: 83.6 kg (184 lb 3.2 oz).    Weight management plan: Discussed healthy diet and exercise guidelines    She reports that she quit smoking about 11 years ago. Her smoking use included cigarettes. She smoked 0.00 packs per day for 0.00 years. She has never used smokeless tobacco.      Counseling Resources:  ATP IV Guidelines  Pooled Cohorts Equation Calculator  Breast Cancer Risk Calculator  BRCA-Related Cancer Risk Assessment: FHS-7 Tool  FRAX Risk Assessment  ICSI Preventive Guidelines  Dietary Guidelines for Americans, 2010  USDA's MyPlate  ASA Prophylaxis  Lung CA Screening    HODAN Mancilla [facility]  "

## 2020-10-22 LAB
CHOLEST SERPL-MCNC: 165 MG/DL
GLUCOSE SERPL-MCNC: 94 MG/DL (ref 70–99)
HDLC SERPL-MCNC: 64 MG/DL
LDLC SERPL CALC-MCNC: 88 MG/DL
NONHDLC SERPL-MCNC: 101 MG/DL
TRIGL SERPL-MCNC: 66 MG/DL
TSH SERPL DL<=0.005 MIU/L-ACNC: 2.32 MU/L (ref 0.4–4)

## 2020-10-22 ASSESSMENT — ANXIETY QUESTIONNAIRES: GAD7 TOTAL SCORE: 1

## 2020-10-23 NOTE — RESULT ENCOUNTER NOTE
Wayne Brown,    This is to let you know that the results of your recent blood tests are all normal.     Let me know if you have any questions.    Isidra PETTIT CNP

## 2020-10-26 LAB
COPATH REPORT: NORMAL
PAP: NORMAL

## 2020-10-27 LAB
FINAL DIAGNOSIS: NORMAL
HPV HR 12 DNA CVX QL NAA+PROBE: NEGATIVE
HPV16 DNA SPEC QL NAA+PROBE: NEGATIVE
HPV18 DNA SPEC QL NAA+PROBE: NEGATIVE
SPECIMEN DESCRIPTION: NORMAL
SPECIMEN SOURCE CVX/VAG CYTO: NORMAL

## 2020-11-02 ENCOUNTER — HOSPITAL ENCOUNTER (OUTPATIENT)
Dept: MAMMOGRAPHY | Facility: CLINIC | Age: 40
End: 2020-11-02
Attending: NURSE PRACTITIONER
Payer: COMMERCIAL

## 2020-11-02 DIAGNOSIS — N63.10 BREAST MASS, RIGHT: ICD-10-CM

## 2020-11-02 PROCEDURE — 76642 ULTRASOUND BREAST LIMITED: CPT | Mod: RT

## 2020-11-02 PROCEDURE — G0279 TOMOSYNTHESIS, MAMMO: HCPCS

## 2020-11-02 NOTE — LETTER
Stephanie Bradshaw  [address] [place] [id number]              November 2, 2020  Date of Exam:     Dear Stephanie:    Thank you for your recent visit.  Breast Imaging Result: We are pleased to inform you that the results of your recent breast imaging show no evidence of malignancy (cancer).    If you are experiencing any breast problems such as a lump or localized pain we request that you discuss this with your health care team if you haven t already done so, as additional testing may be necessary.    As you know, early detection of cancer is very important. Although not all cancers are found through breast imaging, it is the most accurate method for early detection. A thorough examination includes a combination of breast imaging, physical examination and breast self-examination. Currently the American College of Radiology and the Society of Breast Imaging recommend breast imaging beginning at the age of 40.    A report of your breast imaging results was sent to: Isidra Méndez    Your breast imaging will become part of your medical file here at [facility] for at least 10 years. You are responsible for informing any new health care team or breast imaging facility of the date and location of this examination.    We appreciate the opportunity to participate in your health care.    Sincerely,  Jaron Grijalva MD  [facility]

## 2020-11-02 NOTE — RESULT ENCOUNTER NOTE
Wayne Brown,    This note is to let you know that your mammogram and ultrasound on your right breast came back with benign findings. This is excellent news! Let me know if you have any questions.    Isidra PETTIT CNP

## 2020-12-20 ENCOUNTER — HEALTH MAINTENANCE LETTER (OUTPATIENT)
Age: 40
End: 2020-12-20

## 2021-09-16 ENCOUNTER — TELEPHONE (OUTPATIENT)
Dept: OPHTHALMOLOGY | Facility: CLINIC | Age: 41
End: 2021-09-16

## 2021-09-16 NOTE — TELEPHONE ENCOUNTER
Spoke to pt at 1757    Left  eye floaters times one week-- 3 smaller floaters and one larger floater    Vision stable    No pain  No redness    H/o mild myopia -- no previous LASIK/refracive surgery per pt    Scheduled tomorrow with Dr. Chaudhry    Pt aware of date/time/location at [facility]    Raul Canales RN 5:51 PM 09/16/21              [facility] Call Center    Phone Message    May a detailed message be left on voicemail: yes     Reason for Call: Other: Pt calling in to be seen for floaters please call pt back ASAP for scheduling as writer unable to sched per protocols.      Action Taken: Message routed to:  [facility] eye     Travel Screening: Not Applicable

## 2021-09-17 ENCOUNTER — OFFICE VISIT (OUTPATIENT)
Dept: OPHTHALMOLOGY | Facility: CLINIC | Age: 41
End: 2021-09-17
Attending: OPHTHALMOLOGY
Payer: COMMERCIAL

## 2021-09-17 DIAGNOSIS — H43.392 VITREOUS SYNERESIS OF LEFT EYE: Primary | ICD-10-CM

## 2021-09-17 PROCEDURE — 92134 CPTRZ OPH DX IMG PST SGM RTA: CPT | Performed by: OPHTHALMOLOGY

## 2021-09-17 PROCEDURE — G0463 HOSPITAL OUTPT CLINIC VISIT: HCPCS

## 2021-09-17 PROCEDURE — 99203 OFFICE O/P NEW LOW 30 MIN: CPT | Mod: GC | Performed by: OPHTHALMOLOGY

## 2021-09-17 ASSESSMENT — TONOMETRY
OD_IOP_MMHG: 18
OS_IOP_MMHG: 18
IOP_METHOD: TONOPEN

## 2021-09-17 ASSESSMENT — CONF VISUAL FIELD
OD_NORMAL: 1
METHOD: COUNTING FINGERS
OS_NORMAL: 1

## 2021-09-17 ASSESSMENT — VISUAL ACUITY
OS_CC+: +
CORRECTION_TYPE: GLASSES
METHOD: SNELLEN - LINEAR
OS_CC: 20/20
OD_CC: 20/20
OD_CC+: +

## 2021-09-17 ASSESSMENT — REFRACTION_WEARINGRX
OS_SPHERE: -1.00
OS_AXIS: 065
OD_AXIS: 108
SPECS_TYPE: SVL DIST
OD_CYLINDER: +0.25
OS_CYLINDER: +0.25
OD_SPHERE: -1.00

## 2021-09-17 ASSESSMENT — EXTERNAL EXAM - LEFT EYE: OS_EXAM: NORMAL

## 2021-09-17 ASSESSMENT — SLIT LAMP EXAM - LIDS
COMMENTS: NORMAL
COMMENTS: NORMAL

## 2021-09-17 ASSESSMENT — CUP TO DISC RATIO
OS_RATIO: 0.2
OD_RATIO: 0.2

## 2021-09-17 ASSESSMENT — EXTERNAL EXAM - RIGHT EYE: OD_EXAM: NORMAL

## 2021-09-17 NOTE — PROGRESS NOTES
CC -   New Floaters left eye     INTERVAL HISTORY - Initial visit with me.  New floaters OS x 1 week ~ 9/10/21, no trauma no flashes    Floaters started about a week ago in the left eye. No trauma inciting this. No flashes of lights. Vision stable and unchanged. No curtain or scotomas. Glass usage occasionally.     She reports one primary floater which catches her attention - it is wide and flat and gray in nature and a few smaller dots in peripheral aspect of her left eye. Percentage wise they occupy ~ 5% of her visual field. She reports that she hasn't seen any today although they can be very bothersome. The floaters move around locally    SHERRY 2019 saw Raulito      PMH -   Stephanie REYES Augustine is a  40 year old year-old patient with a hx of hypothyroidism (on synthroid) and a history of Myopia  & floaters OS  Occasional anemia seen by PMD  No HTn, no DM    PAST OCULAR SURGERY  None     RETINAL IMAGING:  OCT 9-17-21  OD - retina normal, PHF attached  OS - reitna normal, PHF attached      ASSESSMENT & PLAN    # vitreous syneresis OU   - new floaters OS ~ 9/10/21   - advised S/Sx RD 9/2021   - recheck 6 months        # DBH OD   - single tiny SN   - patient has h/o occasional anemia      return to clinic: 6 months, OCT OU    Azeem Nevarez MD - PGY3  Department of Ophthalmology  Pager: [phone]      ATTESTATION     Attending Attestation:     Complete documentation of historical and exam elements from today's encounter can be found in the full encounter summary report (not reduplicated in this progress note).  I personally obtained the chief complaint(s) and history of present illness.  I confirmed and edited as necessary the review of systems, past medical/surgical history, family history, social history, and examination findings as documented by others; and I examined the patient myself.  I personally reviewed the relevant tests, images, and reports as documented above.  I personally reviewed the ophthalmic test(s)  associated with this encounter, agree with the interpretation(s) as documented by the resident/fellow, and have edited the corresponding report(s) as necessary.   I formulated and edited as necessary the assessment and plan and discussed the findings and management plan with the patient and family    Sadia Chaudhry MD, PhD  , Vitreoretinal Surgery  Department of Ophthalmology  [facility]

## 2021-09-17 NOTE — NURSING NOTE
Chief Complaints and History of Present Illnesses   Patient presents with     Floaters Left Eye     Chief Complaint(s) and History of Present Illness(es)     Floaters Left Eye     Laterality: left eye    Quality: small (Also a 'long and flat one')    Duration: 10 days    Course: stable    Associated symptoms: Negative for flashes, shade, peripheral vision loss, blurred vision, headache, nausea, vomiting and photophobia    Context: family history of migraines (Half sister)    Pain scale: 0/10              Comments     Pt notes these floaters in brightly lit environments for about 7 to 10 days. Denies flashing lights. Denies recent trauma.   Casual use of glasses 'just for astigmatism in RE'. No CTL wear. Denies any known eye disease.  Lorie Olson, COT COT 8:50 AM 09/17/2021

## 2021-10-03 ENCOUNTER — HEALTH MAINTENANCE LETTER (OUTPATIENT)
Age: 41
End: 2021-10-03

## 2021-10-07 ENCOUNTER — OFFICE VISIT (OUTPATIENT)
Dept: FAMILY MEDICINE | Facility: CLINIC | Age: 41
End: 2021-10-07
Payer: COMMERCIAL

## 2021-10-07 VITALS
DIASTOLIC BLOOD PRESSURE: 76 MMHG | SYSTOLIC BLOOD PRESSURE: 118 MMHG | HEART RATE: 89 BPM | WEIGHT: 190 LBS | TEMPERATURE: 97.8 F | HEIGHT: 64 IN | OXYGEN SATURATION: 95 % | BODY MASS INDEX: 32.44 KG/M2

## 2021-10-07 DIAGNOSIS — N94.3 PREMENSTRUAL SYNDROME: ICD-10-CM

## 2021-10-07 DIAGNOSIS — E03.9 HYPOTHYROIDISM, UNSPECIFIED TYPE: ICD-10-CM

## 2021-10-07 DIAGNOSIS — R53.82 CHRONIC FATIGUE: Primary | ICD-10-CM

## 2021-10-07 DIAGNOSIS — Z11.59 NEED FOR HEPATITIS C SCREENING TEST: ICD-10-CM

## 2021-10-07 DIAGNOSIS — J45.909 ASTHMA DUE TO ENVIRONMENTAL ALLERGIES: ICD-10-CM

## 2021-10-07 DIAGNOSIS — Z13.220 SCREENING FOR HYPERLIPIDEMIA: ICD-10-CM

## 2021-10-07 LAB
ALBUMIN SERPL-MCNC: 4 G/DL (ref 3.4–5)
ALP SERPL-CCNC: 76 U/L (ref 40–150)
ALT SERPL W P-5'-P-CCNC: 29 U/L (ref 0–50)
ANION GAP SERPL CALCULATED.3IONS-SCNC: 6 MMOL/L (ref 3–14)
AST SERPL W P-5'-P-CCNC: 13 U/L (ref 0–45)
BASOPHILS # BLD AUTO: 0.1 10E3/UL (ref 0–0.2)
BASOPHILS NFR BLD AUTO: 1 %
BILIRUB SERPL-MCNC: 0.3 MG/DL (ref 0.2–1.3)
BUN SERPL-MCNC: 8 MG/DL (ref 7–30)
CALCIUM SERPL-MCNC: 9.4 MG/DL (ref 8.5–10.1)
CHLORIDE BLD-SCNC: 104 MMOL/L (ref 94–109)
CHOLEST SERPL-MCNC: 161 MG/DL
CO2 SERPL-SCNC: 27 MMOL/L (ref 20–32)
CREAT SERPL-MCNC: 0.92 MG/DL (ref 0.52–1.04)
EOSINOPHIL # BLD AUTO: 0.1 10E3/UL (ref 0–0.7)
EOSINOPHIL NFR BLD AUTO: 1 %
ERYTHROCYTE [DISTWIDTH] IN BLOOD BY AUTOMATED COUNT: 12.5 % (ref 10–15)
FASTING STATUS PATIENT QL REPORTED: YES
FERRITIN SERPL-MCNC: 124 NG/ML (ref 12–150)
GFR SERPL CREATININE-BSD FRML MDRD: 78 ML/MIN/1.73M2
GLUCOSE BLD-MCNC: 82 MG/DL (ref 70–99)
HCT VFR BLD AUTO: 40.3 % (ref 35–47)
HDLC SERPL-MCNC: 53 MG/DL
HGB BLD-MCNC: 14.3 G/DL (ref 11.7–15.7)
IRON SATN MFR SERPL: 29 % (ref 15–46)
IRON SERPL-MCNC: 80 UG/DL (ref 35–180)
LDLC SERPL CALC-MCNC: 83 MG/DL
LYMPHOCYTES # BLD AUTO: 2.9 10E3/UL (ref 0.8–5.3)
LYMPHOCYTES NFR BLD AUTO: 36 %
MCH RBC QN AUTO: 31.5 PG (ref 26.5–33)
MCHC RBC AUTO-ENTMCNC: 35.5 G/DL (ref 31.5–36.5)
MCV RBC AUTO: 89 FL (ref 78–100)
MONOCYTES # BLD AUTO: 0.7 10E3/UL (ref 0–1.3)
MONOCYTES NFR BLD AUTO: 8 %
NEUTROPHILS # BLD AUTO: 4.3 10E3/UL (ref 1.6–8.3)
NEUTROPHILS NFR BLD AUTO: 54 %
NONHDLC SERPL-MCNC: 108 MG/DL
PLATELET # BLD AUTO: 267 10E3/UL (ref 150–450)
POTASSIUM BLD-SCNC: 4.2 MMOL/L (ref 3.4–5.3)
PROT SERPL-MCNC: 7.1 G/DL (ref 6.8–8.8)
RBC # BLD AUTO: 4.54 10E6/UL (ref 3.8–5.2)
SODIUM SERPL-SCNC: 137 MMOL/L (ref 133–144)
TIBC SERPL-MCNC: 279 UG/DL (ref 240–430)
TRIGL SERPL-MCNC: 125 MG/DL
TSH SERPL DL<=0.005 MIU/L-ACNC: 0.62 MU/L (ref 0.4–4)
WBC # BLD AUTO: 8 10E3/UL (ref 4–11)

## 2021-10-07 PROCEDURE — 82306 VITAMIN D 25 HYDROXY: CPT | Performed by: FAMILY MEDICINE

## 2021-10-07 PROCEDURE — 83550 IRON BINDING TEST: CPT | Performed by: FAMILY MEDICINE

## 2021-10-07 PROCEDURE — 36415 COLL VENOUS BLD VENIPUNCTURE: CPT | Performed by: FAMILY MEDICINE

## 2021-10-07 PROCEDURE — 82728 ASSAY OF FERRITIN: CPT | Performed by: FAMILY MEDICINE

## 2021-10-07 PROCEDURE — 80050 GENERAL HEALTH PANEL: CPT | Performed by: FAMILY MEDICINE

## 2021-10-07 PROCEDURE — 99214 OFFICE O/P EST MOD 30 MIN: CPT | Performed by: FAMILY MEDICINE

## 2021-10-07 PROCEDURE — 80061 LIPID PANEL: CPT | Performed by: FAMILY MEDICINE

## 2021-10-07 RX ORDER — LEVOTHYROXINE SODIUM 100 MCG
100 TABLET ORAL DAILY
Qty: 90 TABLET | Refills: 1 | Status: SHIPPED | OUTPATIENT
Start: 2021-10-07 | End: 2022-04-28

## 2021-10-07 RX ORDER — MONTELUKAST SODIUM 10 MG/1
10 TABLET ORAL AT BEDTIME
Qty: 90 TABLET | Refills: 3 | Status: CANCELLED | OUTPATIENT
Start: 2021-10-07

## 2021-10-07 RX ORDER — ALBUTEROL SULFATE 90 UG/1
2 AEROSOL, METERED RESPIRATORY (INHALATION)
Status: CANCELLED | OUTPATIENT
Start: 2021-10-07

## 2021-10-07 ASSESSMENT — ASTHMA QUESTIONNAIRES
QUESTION_2 LAST FOUR WEEKS HOW OFTEN HAVE YOU HAD SHORTNESS OF BREATH: ONCE OR TWICE A WEEK
QUESTION_1 LAST FOUR WEEKS HOW MUCH OF THE TIME DID YOUR ASTHMA KEEP YOU FROM GETTING AS MUCH DONE AT WORK, SCHOOL OR AT HOME: NONE OF THE TIME
QUESTION_5 LAST FOUR WEEKS HOW WOULD YOU RATE YOUR ASTHMA CONTROL: COMPLETELY CONTROLLED
ACT_TOTALSCORE: 24
QUESTION_4 LAST FOUR WEEKS HOW OFTEN HAVE YOU USED YOUR RESCUE INHALER OR NEBULIZER MEDICATION (SUCH AS ALBUTEROL): NOT AT ALL
QUESTION_3 LAST FOUR WEEKS HOW OFTEN DID YOUR ASTHMA SYMPTOMS (WHEEZING, COUGHING, SHORTNESS OF BREATH, CHEST TIGHTNESS OR PAIN) WAKE YOU UP AT NIGHT OR EARLIER THAN USUAL IN THE MORNING: NOT AT ALL

## 2021-10-07 ASSESSMENT — MIFFLIN-ST. JEOR: SCORE: 1510.83

## 2021-10-07 NOTE — PROGRESS NOTES
Assessment & Plan       ICD-10-CM    1. Chronic fatigue  R53.82 SYNTHROID 100 MCG tablet     sertraline (ZOLOFT) 50 MG tablet     REVIEW OF HEALTH MAINTENANCE PROTOCOL ORDERS     TSH with free T4 reflex     Vitamin D Deficiency     Iron & Iron Binding Capacity     Ferritin     CBC with Platelets & Differential     Comprehensive metabolic panel     Comprehensive metabolic panel     CBC with Platelets & Differential     Ferritin     Iron & Iron Binding Capacity     Vitamin D Deficiency     TSH with free T4 reflex   2. Hypothyroidism, unspecified type  E03.9 SYNTHROID 100 MCG tablet   3. Premenstrual syndrome  N94.3 sertraline (ZOLOFT) 50 MG tablet   4. Asthma due to environmental allergies  J45.909    5. Need for hepatitis C screening test  Z11.59    6. Screening for hyperlipidemia  Z13.220 Lipid panel reflex to direct LDL Non-fasting     Lipid panel reflex to direct LDL Non-fasting         Review of external notes as documented elsewhere in note        No follow-ups on file.    Hasmukh Landeros MD  [facility] WILLIAMS Brown is a 40 year old who presents for the following health issues  accompanied by her self:    HPI       Patient presents with:  RECHECK: Thyroid   Patient Request: would like labs: TSH, iron, vitamin D (previous out of normal) DHEA-S, testosterone (undergoing IVF)    Patient would like to get labs drawn to assess chronic fatigue  Thyroid function, iron, vitamin d  She is also getting workup for IVF through fertility clinic, requests DHEA, testosterone    Needs medication refills for inhalers    Asthma Follow-Up    Was ACT completed today?    Yes    ACT Total Scores 10/7/2021   ACT TOTAL SCORE (Goal Greater than or Equal to 20) 24   In the past 12 months, how many times did you visit the emergency room for your asthma without being admitted to the hospital? 0   In the past 12 months, how many times were you hospitalized overnight because of your asthma? 0       "    How many days per week do you miss taking your asthma controller medication?  I do not have an asthma controller medication    Please describe any recent triggers for your asthma: None    Have you had any Emergency Room Visits, Urgent Care Visits, or Hospital Admissions since your last office visit?  No    Hypothyroidism Follow-up      Since last visit, patient describes the following symptoms: dry skin, loose stools and fatigue      How many servings of fruits and vegetables do you eat daily?  0-1    On average, how many sweetened beverages do you drink each day (Examples: soda, juice, sweet tea, etc.  Do NOT count diet or artificially sweetened beverages)?   0    How many days per week do you exercise enough to make your heart beat faster? none    How many minutes a day do you exercise enough to make your heart beat faster? none  How many days per week do you miss taking your medication? 1    What makes it hard for you to take your medications?  remembering to take        Review of Systems   Constitutional, HEENT, cardiovascular, pulmonary, gi and gu systems are negative, except as otherwise noted.      Objective    /76   Pulse 89   Temp 97.8  F (36.6  C) (Oral)   Ht 1.616 m (5' 3.62\")   Wt 86.2 kg (190 lb)   SpO2 95%   BMI 33.00 kg/m    Body mass index is 33 kg/m .  Physical Exam   GENERAL: healthy, alert and no distress  EYES: Eyes grossly normal to inspection, PERRL and conjunctivae and sclerae normal  NECK: no adenopathy, no asymmetry, masses, or scars and thyroid normal to palpation  SKIN: no suspicious lesions or rashes  PSYCH: mentation appears normal, affect normal/bright                "

## 2021-10-08 LAB — DEPRECATED CALCIDIOL+CALCIFEROL SERPL-MC: 28 UG/L (ref 20–75)

## 2021-10-08 ASSESSMENT — ASTHMA QUESTIONNAIRES: ACT_TOTALSCORE: 24

## 2021-11-28 ENCOUNTER — HEALTH MAINTENANCE LETTER (OUTPATIENT)
Age: 41
End: 2021-11-28

## 2021-12-20 ENCOUNTER — LAB (OUTPATIENT)
Dept: LAB | Facility: CLINIC | Age: 41
End: 2021-12-20
Payer: COMMERCIAL

## 2021-12-20 ENCOUNTER — HOSPITAL ENCOUNTER (OUTPATIENT)
Dept: ULTRASOUND IMAGING | Facility: CLINIC | Age: 41
Discharge: HOME | End: 2021-12-20
Attending: OBSTETRICS & GYNECOLOGY | Admitting: OBSTETRICS & GYNECOLOGY
Payer: COMMERCIAL

## 2021-12-20 DIAGNOSIS — Z31.83 ENCOUNTER FOR ASSISTED REPRODUCTIVE FERTILITY CYCLE: ICD-10-CM

## 2021-12-20 DIAGNOSIS — Z31.83 ENCOUNTER FOR ASSISTED REPRODUCTIVE FERTILITY CYCLE: Primary | ICD-10-CM

## 2021-12-20 LAB
ALBUMIN SERPL-MCNC: 3.8 G/DL (ref 3.5–5)
ALP SERPL-CCNC: 77 U/L (ref 45–120)
ALT SERPL W P-5'-P-CCNC: 18 U/L (ref 0–45)
ANION GAP SERPL CALCULATED.3IONS-SCNC: 10 MMOL/L (ref 5–18)
AST SERPL W P-5'-P-CCNC: 15 U/L (ref 0–40)
BILIRUB SERPL-MCNC: 0.4 MG/DL (ref 0–1)
BUN SERPL-MCNC: 8 MG/DL (ref 8–22)
CALCIUM SERPL-MCNC: 9.4 MG/DL (ref 8.5–10.5)
CHLORIDE BLD-SCNC: 105 MMOL/L (ref 98–107)
CO2 SERPL-SCNC: 25 MMOL/L (ref 22–31)
CREAT SERPL-MCNC: 0.81 MG/DL (ref 0.6–1.1)
ERYTHROCYTE [DISTWIDTH] IN BLOOD BY AUTOMATED COUNT: 12.8 % (ref 10–15)
ESTRADIOL SERPL-MCNC: 178 PG/ML
FSH SERPL-ACNC: 11 MIU/ML
GFR SERPL CREATININE-BSD FRML MDRD: >90 ML/MIN/1.73M2
GLUCOSE BLD-MCNC: 89 MG/DL (ref 70–125)
HCG SERPL QL: NEGATIVE
HCT VFR BLD AUTO: 39.1 % (ref 35–47)
HGB BLD-MCNC: 13.4 G/DL (ref 11.7–15.7)
LH SERPL-ACNC: 27.1 MIU/ML
MCH RBC QN AUTO: 31.2 PG (ref 26.5–33)
MCHC RBC AUTO-ENTMCNC: 34.3 G/DL (ref 31.5–36.5)
MCV RBC AUTO: 91 FL (ref 78–100)
PLATELET # BLD AUTO: 254 10E3/UL (ref 150–450)
POTASSIUM BLD-SCNC: 4 MMOL/L (ref 3.5–5)
PROGEST SERPL-MCNC: 0.6 NG/ML
PROT SERPL-MCNC: 6.2 G/DL (ref 6–8)
RBC # BLD AUTO: 4.3 10E6/UL (ref 3.8–5.2)
SODIUM SERPL-SCNC: 140 MMOL/L (ref 136–145)
TSH SERPL DL<=0.005 MIU/L-ACNC: 0.84 UIU/ML (ref 0.3–5)
WBC # BLD AUTO: 7.2 10E3/UL (ref 4–11)

## 2021-12-20 PROCEDURE — 36415 COLL VENOUS BLD VENIPUNCTURE: CPT

## 2021-12-20 PROCEDURE — 83002 ASSAY OF GONADOTROPIN (LH): CPT

## 2021-12-20 PROCEDURE — 85027 COMPLETE CBC AUTOMATED: CPT

## 2021-12-20 PROCEDURE — 84144 ASSAY OF PROGESTERONE: CPT

## 2021-12-20 PROCEDURE — 84443 ASSAY THYROID STIM HORMONE: CPT

## 2021-12-20 PROCEDURE — 80053 COMPREHEN METABOLIC PANEL: CPT

## 2021-12-20 PROCEDURE — 76830 TRANSVAGINAL US NON-OB: CPT

## 2021-12-20 PROCEDURE — 83001 ASSAY OF GONADOTROPIN (FSH): CPT

## 2021-12-20 PROCEDURE — 84702 CHORIONIC GONADOTROPIN TEST: CPT

## 2021-12-20 PROCEDURE — 82670 ASSAY OF TOTAL ESTRADIOL: CPT

## 2021-12-21 LAB — HCG SERPL-ACNC: <2 MLU/ML (ref 0–4)

## 2021-12-29 ENCOUNTER — LAB (OUTPATIENT)
Dept: LAB | Facility: CLINIC | Age: 41
End: 2021-12-29
Payer: COMMERCIAL

## 2021-12-29 DIAGNOSIS — Z31.83 ENCOUNTER FOR ASSISTED REPRODUCTIVE FERTILITY CYCLE: ICD-10-CM

## 2021-12-29 DIAGNOSIS — Z11.59 NEED FOR HEPATITIS C SCREENING TEST: Primary | ICD-10-CM

## 2021-12-29 LAB
ESTRADIOL SERPL-MCNC: 26 PG/ML
LH SERPL-ACNC: 0.8 IU/L
PROGEST SERPL-MCNC: 3 NG/ML

## 2021-12-29 PROCEDURE — 36415 COLL VENOUS BLD VENIPUNCTURE: CPT

## 2021-12-29 PROCEDURE — 84144 ASSAY OF PROGESTERONE: CPT

## 2021-12-29 PROCEDURE — 82670 ASSAY OF TOTAL ESTRADIOL: CPT

## 2021-12-29 PROCEDURE — 83002 ASSAY OF GONADOTROPIN (LH): CPT

## 2021-12-31 ENCOUNTER — LAB (OUTPATIENT)
Dept: LAB | Facility: CLINIC | Age: 41
End: 2021-12-31
Payer: COMMERCIAL

## 2021-12-31 DIAGNOSIS — Z31.83 ENCOUNTER FOR ASSISTED REPRODUCTIVE FERTILITY CYCLE: ICD-10-CM

## 2021-12-31 DIAGNOSIS — Z11.59 NEED FOR HEPATITIS C SCREENING TEST: ICD-10-CM

## 2021-12-31 LAB
ESTRADIOL SERPL-MCNC: 69 PG/ML
HCV AB SERPL QL IA: NONREACTIVE
LH SERPL-ACNC: 1.4 IU/L
PROGEST SERPL-MCNC: 0.9 NG/ML

## 2021-12-31 PROCEDURE — 84144 ASSAY OF PROGESTERONE: CPT

## 2021-12-31 PROCEDURE — 83002 ASSAY OF GONADOTROPIN (LH): CPT

## 2021-12-31 PROCEDURE — 36415 COLL VENOUS BLD VENIPUNCTURE: CPT

## 2021-12-31 PROCEDURE — 82670 ASSAY OF TOTAL ESTRADIOL: CPT

## 2021-12-31 PROCEDURE — 86803 HEPATITIS C AB TEST: CPT

## 2022-01-18 ENCOUNTER — LAB (OUTPATIENT)
Dept: LAB | Facility: CLINIC | Age: 42
End: 2022-01-18
Payer: COMMERCIAL

## 2022-01-18 ENCOUNTER — MEDICAL CORRESPONDENCE (OUTPATIENT)
Dept: HEALTH INFORMATION MANAGEMENT | Facility: CLINIC | Age: 42
End: 2022-01-18

## 2022-01-18 DIAGNOSIS — Z32.00 UNCONFIRMED PREGNANCY: Primary | ICD-10-CM

## 2022-01-18 LAB
B-HCG SERPL-ACNC: <1 IU/L (ref 0–5)
PROGEST SERPL-MCNC: 38.1 NG/ML

## 2022-01-18 PROCEDURE — 36415 COLL VENOUS BLD VENIPUNCTURE: CPT

## 2022-01-18 PROCEDURE — 84144 ASSAY OF PROGESTERONE: CPT

## 2022-01-18 PROCEDURE — 84702 CHORIONIC GONADOTROPIN TEST: CPT

## 2022-04-27 DIAGNOSIS — H43.392 VITREOUS SYNERESIS OF LEFT EYE: Primary | ICD-10-CM

## 2022-05-03 ENCOUNTER — OFFICE VISIT (OUTPATIENT)
Dept: OPHTHALMOLOGY | Facility: CLINIC | Age: 42
End: 2022-05-03
Attending: OPHTHALMOLOGY
Payer: COMMERCIAL

## 2022-05-03 DIAGNOSIS — H43.392 VITREOUS SYNERESIS OF LEFT EYE: ICD-10-CM

## 2022-05-03 PROCEDURE — 99213 OFFICE O/P EST LOW 20 MIN: CPT | Mod: GC | Performed by: OPHTHALMOLOGY

## 2022-05-03 PROCEDURE — 92134 CPTRZ OPH DX IMG PST SGM RTA: CPT | Performed by: OPHTHALMOLOGY

## 2022-05-03 PROCEDURE — G0463 HOSPITAL OUTPT CLINIC VISIT: HCPCS | Mod: 25

## 2022-05-03 ASSESSMENT — TONOMETRY
OS_IOP_MMHG: 19
IOP_METHOD: TONOPEN
OD_IOP_MMHG: 18

## 2022-05-03 ASSESSMENT — SLIT LAMP EXAM - LIDS
COMMENTS: NORMAL
COMMENTS: NORMAL

## 2022-05-03 ASSESSMENT — CONF VISUAL FIELD
METHOD: COUNTING FINGERS
OD_NORMAL: 1
OS_NORMAL: 1

## 2022-05-03 ASSESSMENT — EXTERNAL EXAM - LEFT EYE: OS_EXAM: NORMAL

## 2022-05-03 ASSESSMENT — VISUAL ACUITY
METHOD: SNELLEN - LINEAR
OD_SC: 20/20
OS_SC: 20/20

## 2022-05-03 ASSESSMENT — CUP TO DISC RATIO
OS_RATIO: 0.2
OD_RATIO: 0.2

## 2022-05-03 ASSESSMENT — EXTERNAL EXAM - RIGHT EYE: OD_EXAM: NORMAL

## 2022-05-03 NOTE — NURSING NOTE
Chief Complaints and History of Present Illnesses   Patient presents with     Follow Up     Vitreous syneresis of left eye     Chief Complaint(s) and History of Present Illness(es)     Follow Up     Laterality: left eye    Course: stable    Associated symptoms: dryness (intermittent) and floaters (stable, left eye).  Negative for eye pain and flashes    Pain scale: 0/10    Comments: Vitreous syneresis of left eye              Comments     She states that her vision has seemed stable in both eyes since her last eye exam.  She has some eye dryness, intermittently in each eye.    NERIS Hearn 12:17 PM  May 3, 2022

## 2022-05-03 NOTE — PROGRESS NOTES
CC -   Vitreous syneresis with floaters    INTERVAL HISTORY - Six month follow up floaters left eye. Stable 3-5 floaters, vision is stable, no flashes.       PMH -   Stephanie Bradshaw is a  41 year old year-old patient with a hx of hypothyroidism (on synthroid) and a history of Myopia  & floaters left eye. Evaluated in September 2021 for new floaters x 1 week ~9/10/2021 with stable vision, without trauma or flashes. Noted to have vitreous syneresis in both eyes with a small DBH in the right eye.  Occasional anemia seen by PMD  No HTn, no DM    PAST OCULAR SURGERY  None     RETINAL IMAGING:  OCT 05-03-22  OD - retina normal, PHF attached stable  OS - reitna normal, PHF attached stable      ASSESSMENT & PLAN    # vitreous syneresis OU   - new floaters OS ~ 9/10/21 stable 5/3/22   - advised S/Sx RD 5/3/22   - recheck 1 year        # h/o DBH OD   - single tiny SN resolved   - patient has h/o occasional anemia Hg 13.4 12/2021        # lattice & CR scar OS       return to clinic: 1 year, OCT OU    Adrienne Deluca MD  Ophthalmology Resident, PGY-3  [facility]         ATTESTATION     Attending Attestation:     Complete documentation of historical and exam elements from today's encounter can be found in the full encounter summary report (not reduplicated in this progress note).  I personally obtained the chief complaint(s) and history of present illness.  I confirmed and edited as necessary the review of systems, past medical/surgical history, family history, social history, and examination findings as documented by others; and I examined the patient myself.  I personally reviewed the relevant tests, images, and reports as documented above.  I personally reviewed the ophthalmic test(s) associated with this encounter, agree with the interpretation(s) as documented by the resident/fellow, and have edited the corresponding report(s) as necessary.   I formulated and edited as necessary the assessment and plan and  discussed the findings and management plan with the patient and family    Sadia Chaudhry MD, PhD  , Vitreoretinal Surgery  Department of Ophthalmology  [facility]

## 2022-06-01 ASSESSMENT — ENCOUNTER SYMPTOMS
PALPITATIONS: 0
HEARTBURN: 0
HEADACHES: 0
MYALGIAS: 0
DIARRHEA: 0
SORE THROAT: 0
SHORTNESS OF BREATH: 0
WEAKNESS: 0
JOINT SWELLING: 0
NAUSEA: 0
FEVER: 0
FREQUENCY: 0
HEMATOCHEZIA: 0
CONSTIPATION: 0
EYE PAIN: 0
DYSURIA: 0
NERVOUS/ANXIOUS: 1
HEMATURIA: 0
BREAST MASS: 0
CHILLS: 0
ABDOMINAL PAIN: 0
ARTHRALGIAS: 0
PARESTHESIAS: 0
COUGH: 0

## 2022-06-07 ENCOUNTER — OFFICE VISIT (OUTPATIENT)
Dept: FAMILY MEDICINE | Facility: CLINIC | Age: 42
End: 2022-06-07
Payer: COMMERCIAL

## 2022-06-07 VITALS
OXYGEN SATURATION: 96 % | TEMPERATURE: 98.6 F | WEIGHT: 202 LBS | SYSTOLIC BLOOD PRESSURE: 116 MMHG | HEART RATE: 74 BPM | DIASTOLIC BLOOD PRESSURE: 77 MMHG | BODY MASS INDEX: 35.09 KG/M2

## 2022-06-07 DIAGNOSIS — E03.9 HYPOTHYROIDISM, UNSPECIFIED TYPE: ICD-10-CM

## 2022-06-07 DIAGNOSIS — J45.909 ASTHMA DUE TO ENVIRONMENTAL ALLERGIES: ICD-10-CM

## 2022-06-07 DIAGNOSIS — Z23 HIGH PRIORITY FOR 2019-NCOV VACCINE: ICD-10-CM

## 2022-06-07 DIAGNOSIS — Z12.31 ENCOUNTER FOR SCREENING MAMMOGRAM FOR BREAST CANCER: ICD-10-CM

## 2022-06-07 DIAGNOSIS — N94.3 PREMENSTRUAL SYNDROME: ICD-10-CM

## 2022-06-07 DIAGNOSIS — Z13.220 LIPID SCREENING: ICD-10-CM

## 2022-06-07 DIAGNOSIS — Z00.00 ROUTINE HISTORY AND PHYSICAL EXAMINATION OF ADULT: ICD-10-CM

## 2022-06-07 DIAGNOSIS — R53.82 CHRONIC FATIGUE: ICD-10-CM

## 2022-06-07 LAB
CHOLEST SERPL-MCNC: 187 MG/DL
DEPRECATED CALCIDIOL+CALCIFEROL SERPL-MC: 42 UG/L (ref 20–75)
FASTING STATUS PATIENT QL REPORTED: NO
HDLC SERPL-MCNC: 51 MG/DL
LDLC SERPL CALC-MCNC: 111 MG/DL
NONHDLC SERPL-MCNC: 136 MG/DL
TRIGL SERPL-MCNC: 124 MG/DL
TSH SERPL DL<=0.005 MIU/L-ACNC: 1.33 MU/L (ref 0.4–4)

## 2022-06-07 PROCEDURE — 90715 TDAP VACCINE 7 YRS/> IM: CPT | Performed by: FAMILY MEDICINE

## 2022-06-07 PROCEDURE — 82306 VITAMIN D 25 HYDROXY: CPT | Performed by: FAMILY MEDICINE

## 2022-06-07 PROCEDURE — 84443 ASSAY THYROID STIM HORMONE: CPT | Performed by: FAMILY MEDICINE

## 2022-06-07 PROCEDURE — 36415 COLL VENOUS BLD VENIPUNCTURE: CPT | Performed by: FAMILY MEDICINE

## 2022-06-07 PROCEDURE — 80061 LIPID PANEL: CPT | Performed by: FAMILY MEDICINE

## 2022-06-07 PROCEDURE — 99214 OFFICE O/P EST MOD 30 MIN: CPT | Mod: 25 | Performed by: FAMILY MEDICINE

## 2022-06-07 PROCEDURE — 90471 IMMUNIZATION ADMIN: CPT | Performed by: FAMILY MEDICINE

## 2022-06-07 PROCEDURE — 0054A COVID-19,PF,PFIZER (12+ YRS): CPT | Performed by: FAMILY MEDICINE

## 2022-06-07 PROCEDURE — 91305 COVID-19,PF,PFIZER (12+ YRS): CPT | Performed by: FAMILY MEDICINE

## 2022-06-07 PROCEDURE — 99396 PREV VISIT EST AGE 40-64: CPT | Mod: 25 | Performed by: FAMILY MEDICINE

## 2022-06-07 RX ORDER — MONTELUKAST SODIUM 10 MG/1
10 TABLET ORAL AT BEDTIME
Qty: 90 TABLET | Refills: 3 | Status: SHIPPED | OUTPATIENT
Start: 2022-06-07 | End: 2023-09-27

## 2022-06-07 RX ORDER — LEVOTHYROXINE SODIUM 100 MCG
100 TABLET ORAL DAILY
Qty: 90 TABLET | Refills: 3 | Status: SHIPPED | OUTPATIENT
Start: 2022-06-07 | End: 2023-09-01

## 2022-06-07 RX ORDER — CHOLECALCIFEROL (VITAMIN D3) 50 MCG
6000 TABLET ORAL DAILY
COMMUNITY

## 2022-06-07 RX ORDER — ALPHA LIPOIC ACID 150 MG
240 CAPSULE ORAL DAILY
COMMUNITY
End: 2023-05-08

## 2022-06-07 ASSESSMENT — ANXIETY QUESTIONNAIRES
1. FEELING NERVOUS, ANXIOUS, OR ON EDGE: SEVERAL DAYS
6. BECOMING EASILY ANNOYED OR IRRITABLE: NOT AT ALL
GAD7 TOTAL SCORE: 3
5. BEING SO RESTLESS THAT IT IS HARD TO SIT STILL: SEVERAL DAYS
3. WORRYING TOO MUCH ABOUT DIFFERENT THINGS: NOT AT ALL
GAD7 TOTAL SCORE: 3
IF YOU CHECKED OFF ANY PROBLEMS ON THIS QUESTIONNAIRE, HOW DIFFICULT HAVE THESE PROBLEMS MADE IT FOR YOU TO DO YOUR WORK, TAKE CARE OF THINGS AT HOME, OR GET ALONG WITH OTHER PEOPLE: SOMEWHAT DIFFICULT
2. NOT BEING ABLE TO STOP OR CONTROL WORRYING: NOT AT ALL
7. FEELING AFRAID AS IF SOMETHING AWFUL MIGHT HAPPEN: NOT AT ALL

## 2022-06-07 ASSESSMENT — ASTHMA QUESTIONNAIRES
ACT_TOTALSCORE: 24
QUESTION_4 LAST FOUR WEEKS HOW OFTEN HAVE YOU USED YOUR RESCUE INHALER OR NEBULIZER MEDICATION (SUCH AS ALBUTEROL): NOT AT ALL
ACT_TOTALSCORE: 24
QUESTION_1 LAST FOUR WEEKS HOW MUCH OF THE TIME DID YOUR ASTHMA KEEP YOU FROM GETTING AS MUCH DONE AT WORK, SCHOOL OR AT HOME: NONE OF THE TIME
QUESTION_5 LAST FOUR WEEKS HOW WOULD YOU RATE YOUR ASTHMA CONTROL: COMPLETELY CONTROLLED
QUESTION_2 LAST FOUR WEEKS HOW OFTEN HAVE YOU HAD SHORTNESS OF BREATH: ONCE OR TWICE A WEEK
QUESTION_3 LAST FOUR WEEKS HOW OFTEN DID YOUR ASTHMA SYMPTOMS (WHEEZING, COUGHING, SHORTNESS OF BREATH, CHEST TIGHTNESS OR PAIN) WAKE YOU UP AT NIGHT OR EARLIER THAN USUAL IN THE MORNING: NOT AT ALL

## 2022-06-07 ASSESSMENT — ENCOUNTER SYMPTOMS
HEARTBURN: 0
EYE PAIN: 0
HEMATOCHEZIA: 0
ARTHRALGIAS: 0
CHILLS: 0
CONSTIPATION: 0
ABDOMINAL PAIN: 0
FEVER: 0
DIARRHEA: 0
MYALGIAS: 0
NERVOUS/ANXIOUS: 1
SHORTNESS OF BREATH: 0
SORE THROAT: 0
PARESTHESIAS: 0
PALPITATIONS: 0
NAUSEA: 0
FREQUENCY: 0
JOINT SWELLING: 0
HEADACHES: 0
COUGH: 0
DYSURIA: 0
WEAKNESS: 0
BREAST MASS: 0
HEMATURIA: 0

## 2022-06-07 ASSESSMENT — PATIENT HEALTH QUESTIONNAIRE - PHQ9
5. POOR APPETITE OR OVEREATING: SEVERAL DAYS
SUM OF ALL RESPONSES TO PHQ QUESTIONS 1-9: 3

## 2022-06-07 NOTE — PROGRESS NOTES
SUBJECTIVE:   CC: Stephanie Bradshaw is an 41 year old woman who presents for preventive health visit.       Patient has been advised of split billing requirements and indicates understanding: Yes  Healthy Habits:     Getting at least 3 servings of Calcium per day:  NO    Bi-annual eye exam:  Yes    Dental care twice a year:  Yes    Sleep apnea or symptoms of sleep apnea:  Daytime drowsiness    Diet:  Vegetarian/vegan    Frequency of exercise:  2-3 days/week    Duration of exercise:  15-30 minutes    Taking medications regularly:  Yes    Barriers to taking medications:  None    Medication side effects:  None    PHQ-2 Total Score: 2    Additional concerns today:  No  She was undergoing fertility treatment.  reversed vasectomy, now trying natural.     She is taking Vitamin D 6, 800 U per day.     Paternal grandfather and paternal uncle diagnosed with colon cancer. Not in contact with Dad, unsure of his history. Unsure but thinks family was diagnosed at age 50-60's. She was seen by genetic screening.     Genetic counselor recommendations 4/21/2020 -   Screening:  Based on this negative test result, it is important for Stephanie and her relatives to refer back to the family history for appropriate cancer screening.    ? Based on the personal and family history information she provided, Stephanie does not meet current National Comprehensive Cancer Network (NCCN) guidelines for high risk breast screening based on the GRICELDA Risk Evaluator v8 model. As such, Stephanie should continue with routine breast imaging. In addition, Stephanie should be receiving clinical breast exams by her physician. Her risks may change over time (due to personal or family history factors), therefore, she is encouraged to keep in touch with me to discuss breast cancer screening options in the future.   ? She should also continue with routine gynecologic exams, skin exams, and colonoscopy screening. Other population cancer screening options, such as  those recommended by the American Cancer Society and the National Comprehensive Cancer Network (NCCN), are also appropriate for Stephanie and her family. These screening recommendations may change if there are changes to Stephanie's personal and/or family history of cancer. Final screening recommendations should be made by each individual's managing physician.      Today's PHQ-2 Score:   PHQ-2 (  [employer]) 2022   Q1: Little interest or pleasure in doing things 1   Q2: Feeling down, depressed or hopeless 1   PHQ-2 Score 2   PHQ-2 Total Score (12-17 Years)- Positive if 3 or more points; Administer PHQ-A if positive -   Q1: Little interest or pleasure in doing things Several days   Q2: Feeling down, depressed or hopeless Several days   PHQ-2 Score 2       Abuse: Current or Past (Physical, Sexual or Emotional) - No  Do you feel safe in your environment? Yes    Have you ever done Advance Care Planning? (For example, a Health Directive, POLST, or a discussion with a medical provider or your loved ones about your wishes): No, advance care planning information given to patient to review.  Patient declined advance care planning discussion at this time.    Social History     Tobacco Use     Smoking status: Former Smoker     Packs/day: 0.00     Years: 0.00     Pack years: 0.00     Types: Cigarettes     Quit date: 2009     Years since quittin.3     Smokeless tobacco: Never Used   Substance Use Topics     Alcohol use: Yes     Comment: rarely     If you drink alcohol do you typically have >3 drinks per day or >7 drinks per week? No    Alcohol Use 2022   Prescreen: >3 drinks/day or >7 drinks/week? No   Prescreen: >3 drinks/day or >7 drinks/week? -       Reviewed orders with patient.  Reviewed health maintenance and updated orders accordingly - Yes    Breast Cancer Screening:    FHS-7: No flowsheet data found.    Pertinent mammograms are reviewed under the imaging tab.    History of abnormal Pap smear: NO - age 30-65  PAP every 5 years with negative HPV co-testing recommended  PAP / HPV Latest Ref Rng & Units 10/21/2020   PAP (Historical) - NIL   HPV16 NEG:Negative Negative   HPV18 NEG:Negative Negative   HRHPV NEG:Negative Negative     Reviewed and updated as needed this visit by clinical staff                    Reviewed and updated as needed this visit by Provider                     Review of Systems   Constitutional: Negative for chills and fever.   HENT: Negative for congestion, ear pain, hearing loss and sore throat.    Eyes: Negative for pain and visual disturbance.   Respiratory: Negative for cough and shortness of breath.    Cardiovascular: Negative for chest pain, palpitations and peripheral edema.   Gastrointestinal: Negative for abdominal pain, constipation, diarrhea, heartburn, hematochezia and nausea.   Breasts:  Negative for tenderness, breast mass and discharge.   Genitourinary: Positive for pelvic pain. Negative for dysuria, frequency, genital sores, hematuria, urgency, vaginal bleeding and vaginal discharge.   Musculoskeletal: Negative for arthralgias, joint swelling and myalgias.   Skin: Negative for rash.   Neurological: Negative for weakness, headaches and paresthesias.   Psychiatric/Behavioral: Negative for mood changes. The patient is nervous/anxious.           OBJECTIVE:   Physical Exam   /77 (BP Location: Right arm, Patient Position: Chair, Cuff Size: Adult Regular)   Pulse 74   Temp 98.6  F (37  C) (Temporal)   Wt 91.6 kg (202 lb)   LMP 05/24/2022   SpO2 96%   Breastfeeding No   BMI 35.09 kg/m    GENERAL: healthy, alert and no distress  EYES: Eyes grossly normal to inspection,  conjunctivae and sclerae normal  NECK: no adenopathy, no asymmetry, masses, or scars and thyroid normal to palpation  RESP: lungs clear to auscultation - no rales, rhonchi or wheezes  BREAST: normal without masses, tenderness or nipple discharge and no palpable axillary masses or adenopathy  CV: regular rate and  rhythm, normal S1 S2  ABDOMEN: soft, nontender, no hepatosplenomegaly, no masses and bowel sounds normal  MS: no gross musculoskeletal defects noted, no edema  SKIN: no suspicious lesions or rashes  NEURO: Normal strength and tone, mentation intact and speech normal  PSYCH: mentation appears normal, affect normal/bright    Diagnostic Test Results:  Labs reviewed in Epic    ASSESSMENT/PLAN:     Routine history and physical examination of adult  - up to date with pap smear  - Due to family history no increased colon cancer risk, routine screening at age 45.     Hypothyroidism, unspecified type  - stable, refill X 1 year   - TSH with free T4 reflex; Future  - SYNTHROID 100 MCG tablet; Take 1 tablet (100 mcg) by mouth daily  Dispense: 90 tablet; Refill: 3  - TSH with free T4 reflex    Asthma due to environmental allergies  ACT Total Scores 10/7/2021 6/7/2022 6/7/2022   ACT TOTAL SCORE (Goal Greater than or Equal to 20) 24 24 24   In the past 12 months, how many times did you visit the emergency room for your asthma without being admitted to the hospital? 0 0 0   In the past 12 months, how many times were you hospitalized overnight because of your asthma? 0 0 0     - stable, refill X 1 year  - montelukast (SINGULAIR) 10 MG tablet; Take 1 tablet (10 mg) by mouth At Bedtime  Dispense: 90 tablet; Refill: 3    Premenstrual syndrome  PHQ 10/21/2020 6/7/2022   PHQ-9 Total Score 0 3   Q9: Thoughts of better off dead/self-harm past 2 weeks Not at all Not at all     - sertraline (ZOLOFT) 50 MG tablet; Take 1.5 tablets (75 mg) by mouth daily  Dispense: 135 tablet; Refill: 3    Chronic fatigue  - SYNTHROID 100 MCG tablet; Take 1 tablet (100 mcg) by mouth daily  Dispense: 90 tablet; Refill: 3  - sertraline (ZOLOFT) 50 MG tablet; Take 1.5 tablets (75 mg) by mouth daily  Dispense: 135 tablet; Refill: 3  - Vitamin D Deficiency; Future  - Vitamin D Deficiency    High priority for 2019-nCoV vaccine  - received booster    Lipid  "screening  - Lipid Profile (Chol, Trig, HDL, LDL calc); Future  - Lipid Profile (Chol, Trig, HDL, LDL calc)    Encounter for screening mammogram for breast cancer  - MA Screening Digital Bilateral; Future        Patient has been advised of split billing requirements and indicates understanding: Yes    COUNSELING:  Reviewed preventive health counseling, as reflected in patient instructions    Estimated body mass index is 33 kg/m  as calculated from the following:    Height as of 10/7/21: 1.616 m (5' 3.62\").    Weight as of 10/7/21: 86.2 kg (190 lb).    Weight management plan: Discussed healthy diet and exercise guidelines    She reports that she quit smoking about 13 years ago. Her smoking use included cigarettes. She smoked 0.00 packs per day for 0.00 years. She has never used smokeless tobacco.      Counseling Resources:  ATP IV Guidelines  Pooled Cohorts Equation Calculator  Breast Cancer Risk Calculator  BRCA-Related Cancer Risk Assessment: FHS-7 Tool  FRAX Risk Assessment  ICSI Preventive Guidelines  Dietary Guidelines for Americans, 2010  USDA's MyPlate  ASA Prophylaxis  Lung CA Screening    Sophia Caal MD  [facility]  "

## 2022-06-07 NOTE — NURSING NOTE
Prior to immunization administration, verified patients identity using patient s name and date of birth. Please see Immunization Activity for additional information.     Screening Questionnaire for Adult Immunization    Are you sick today?   No   Do you have allergies to medications, food, a vaccine component or latex?   No   Have you ever had a serious reaction after receiving a vaccination?   No   Do you have a long-term health problem with heart, lung, kidney, or metabolic disease (e.g., diabetes), asthma, a blood disorder, no spleen, complement component deficiency, a cochlear implant, or a spinal fluid leak?  Are you on long-term aspirin therapy?   No   Do you have cancer, leukemia, HIV/AIDS, or any other immune system problem?   No   Do you have a parent, brother, or sister with an immune system problem?   No   In the past 3 months, have you taken medications that affect  your immune system, such as prednisone, other steroids, or anticancer drugs; drugs for the treatment of rheumatoid arthritis, Crohn s disease, or psoriasis; or have you had radiation treatments?   No   Have you had a seizure, or a brain or other nervous system problem?   No   During the past year, have you received a transfusion of blood or blood    products, or been given immune (gamma) globulin or antiviral drug?   No   For women: Are you pregnant or is there a chance you could become       pregnant during the next month?   No   Have you received any vaccinations in the past 4 weeks?   No     Immunization questionnaire answers were all negative.        Per orders of Dr. Caal, injection of Tdap and Pfizer given by Oswald Da Silva MA. Patient instructed to remain in clinic for 15 minutes afterwards, and to report any adverse reaction to me immediately.       Screening performed by Oswald Da Silva MA on 6/7/2022 at 10:36 AM.

## 2022-06-21 ENCOUNTER — MYC MEDICAL ADVICE (OUTPATIENT)
Dept: FAMILY MEDICINE | Facility: CLINIC | Age: 42
End: 2022-06-21

## 2022-06-21 DIAGNOSIS — Z11.1 TUBERCULOSIS SCREENING: Primary | ICD-10-CM

## 2022-06-23 NOTE — TELEPHONE ENCOUNTER
Dr Caal,    Can you sign pended order for TB gold test. He needs it for work    Thanks    Kamilah Read, RN, BSN  [facility]

## 2022-06-23 NOTE — TELEPHONE ENCOUNTER
Writer responded via [employer].    BERNARDO FariasN, RN  [facility]

## 2022-06-24 ENCOUNTER — LAB (OUTPATIENT)
Dept: LAB | Facility: CLINIC | Age: 42
End: 2022-06-24
Payer: COMMERCIAL

## 2022-06-24 DIAGNOSIS — Z11.1 TUBERCULOSIS SCREENING: ICD-10-CM

## 2022-06-24 PROCEDURE — 86481 TB AG RESPONSE T-CELL SUSP: CPT

## 2022-06-24 PROCEDURE — 36415 COLL VENOUS BLD VENIPUNCTURE: CPT

## 2022-06-26 LAB
GAMMA INTERFERON BACKGROUND BLD IA-ACNC: 0.03 IU/ML
M TB IFN-G BLD-IMP: NEGATIVE
M TB IFN-G CD4+ BCKGRND COR BLD-ACNC: 9.97 IU/ML
MITOGEN IGNF BCKGRD COR BLD-ACNC: 0.03 IU/ML
MITOGEN IGNF BCKGRD COR BLD-ACNC: 0.04 IU/ML
QUANTIFERON MITOGEN: 10 IU/ML
QUANTIFERON NIL TUBE: 0.03 IU/ML
QUANTIFERON TB1 TUBE: 0.07 IU/ML
QUANTIFERON TB2 TUBE: 0.06

## 2022-06-28 ENCOUNTER — ANCILLARY PROCEDURE (OUTPATIENT)
Dept: MAMMOGRAPHY | Facility: CLINIC | Age: 42
End: 2022-06-28
Attending: FAMILY MEDICINE
Payer: COMMERCIAL

## 2022-06-28 DIAGNOSIS — Z12.31 VISIT FOR SCREENING MAMMOGRAM: ICD-10-CM

## 2022-06-28 DIAGNOSIS — Z12.31 ENCOUNTER FOR SCREENING MAMMOGRAM FOR BREAST CANCER: ICD-10-CM

## 2022-06-28 PROCEDURE — 77067 SCR MAMMO BI INCL CAD: CPT | Mod: TC | Performed by: RADIOLOGY

## 2022-06-28 PROCEDURE — 77063 BREAST TOMOSYNTHESIS BI: CPT | Mod: TC | Performed by: RADIOLOGY

## 2022-07-01 ENCOUNTER — LAB (OUTPATIENT)
Dept: LAB | Facility: CLINIC | Age: 42
End: 2022-07-01
Payer: COMMERCIAL

## 2022-07-01 DIAGNOSIS — Z31.83 ENCOUNTER FOR ASSISTED REPRODUCTIVE FERTILITY CYCLE: ICD-10-CM

## 2022-07-01 LAB
ESTRADIOL SERPL-MCNC: 219 PG/ML
LH SERPL-ACNC: 17.1 MIU/ML
PROGEST SERPL-MCNC: 0.2 NG/ML

## 2022-07-01 PROCEDURE — 83002 ASSAY OF GONADOTROPIN (LH): CPT

## 2022-07-01 PROCEDURE — 82670 ASSAY OF TOTAL ESTRADIOL: CPT

## 2022-07-01 PROCEDURE — 36415 COLL VENOUS BLD VENIPUNCTURE: CPT

## 2022-07-01 PROCEDURE — 84144 ASSAY OF PROGESTERONE: CPT

## 2022-07-21 ENCOUNTER — MYC MEDICAL ADVICE (OUTPATIENT)
Dept: FAMILY MEDICINE | Facility: CLINIC | Age: 42
End: 2022-07-21

## 2022-07-25 ENCOUNTER — LAB (OUTPATIENT)
Dept: LAB | Facility: CLINIC | Age: 42
End: 2022-07-25
Payer: COMMERCIAL

## 2022-07-25 DIAGNOSIS — Z32.00 UNCONFIRMED PREGNANCY: Primary | ICD-10-CM

## 2022-07-25 LAB
B-HCG SERPL-ACNC: <1 IU/L (ref 0–5)
PROGEST SERPL-MCNC: 9.2 NG/ML

## 2022-07-25 PROCEDURE — 84144 ASSAY OF PROGESTERONE: CPT

## 2022-07-25 PROCEDURE — 36415 COLL VENOUS BLD VENIPUNCTURE: CPT

## 2022-07-25 PROCEDURE — 84702 CHORIONIC GONADOTROPIN TEST: CPT

## 2022-08-01 ENCOUNTER — LAB (OUTPATIENT)
Dept: LAB | Facility: CLINIC | Age: 42
End: 2022-08-01
Payer: COMMERCIAL

## 2022-08-01 DIAGNOSIS — N97.9 FEMALE INFERTILITY, UNSPECIFIED: Primary | ICD-10-CM

## 2022-08-01 DIAGNOSIS — N97.9 FEMALE INFERTILITY, UNSPECIFIED: ICD-10-CM

## 2022-08-01 LAB
B-HCG SERPL-ACNC: <1 IU/L (ref 0–5)
ESTRADIOL SERPL-MCNC: 28 PG/ML
LH SERPL-ACNC: 6.6 MIU/ML
PROGEST SERPL-MCNC: 0.1 NG/ML
TSH SERPL DL<=0.005 MIU/L-ACNC: 1.26 MU/L (ref 0.4–4)

## 2022-08-01 PROCEDURE — 84702 CHORIONIC GONADOTROPIN TEST: CPT

## 2022-08-01 PROCEDURE — 82670 ASSAY OF TOTAL ESTRADIOL: CPT

## 2022-08-01 PROCEDURE — 84443 ASSAY THYROID STIM HORMONE: CPT

## 2022-08-01 PROCEDURE — 83002 ASSAY OF GONADOTROPIN (LH): CPT

## 2022-08-01 PROCEDURE — 84144 ASSAY OF PROGESTERONE: CPT

## 2022-08-01 PROCEDURE — 36415 COLL VENOUS BLD VENIPUNCTURE: CPT

## 2022-08-05 ENCOUNTER — LAB (OUTPATIENT)
Dept: LAB | Facility: CLINIC | Age: 42
End: 2022-08-05
Payer: COMMERCIAL

## 2022-08-05 DIAGNOSIS — Z31.83 ENCOUNTER FOR ASSISTED REPRODUCTIVE FERTILITY CYCLE: Primary | ICD-10-CM

## 2022-08-05 LAB
ALBUMIN SERPL-MCNC: 3.7 G/DL (ref 3.4–5)
ALP SERPL-CCNC: 66 U/L (ref 40–150)
ALT SERPL W P-5'-P-CCNC: 17 U/L (ref 0–50)
ANION GAP SERPL CALCULATED.3IONS-SCNC: 6 MMOL/L (ref 3–14)
AST SERPL W P-5'-P-CCNC: 14 U/L (ref 0–45)
B-HCG SERPL-ACNC: <1 IU/L (ref 0–5)
BILIRUB SERPL-MCNC: 0.5 MG/DL (ref 0.2–1.3)
BUN SERPL-MCNC: 11 MG/DL (ref 7–30)
CALCIUM SERPL-MCNC: 8.9 MG/DL (ref 8.5–10.1)
CHLORIDE BLD-SCNC: 106 MMOL/L (ref 94–109)
CO2 SERPL-SCNC: 26 MMOL/L (ref 20–32)
CREAT SERPL-MCNC: 0.8 MG/DL (ref 0.52–1.04)
ERYTHROCYTE [DISTWIDTH] IN BLOOD BY AUTOMATED COUNT: 12.9 % (ref 10–15)
ESTRADIOL SERPL-MCNC: 217 PG/ML
FSH SERPL IRP2-ACNC: 7.9 MIU/ML
GFR SERPL CREATININE-BSD FRML MDRD: >90 ML/MIN/1.73M2
GLUCOSE BLD-MCNC: 129 MG/DL (ref 70–99)
HCT VFR BLD AUTO: 38.5 % (ref 35–47)
HGB BLD-MCNC: 13.7 G/DL (ref 11.7–15.7)
LH SERPL-ACNC: 13.5 MIU/ML
MCH RBC QN AUTO: 32.1 PG (ref 26.5–33)
MCHC RBC AUTO-ENTMCNC: 35.6 G/DL (ref 31.5–36.5)
MCV RBC AUTO: 90 FL (ref 78–100)
PLATELET # BLD AUTO: 256 10E3/UL (ref 150–450)
POTASSIUM BLD-SCNC: 3.8 MMOL/L (ref 3.4–5.3)
PROGEST SERPL-MCNC: 0.2 NG/ML
PROT SERPL-MCNC: 6.9 G/DL (ref 6.8–8.8)
RBC # BLD AUTO: 4.27 10E6/UL (ref 3.8–5.2)
SODIUM SERPL-SCNC: 138 MMOL/L (ref 133–144)
TSH SERPL DL<=0.005 MIU/L-ACNC: 1.25 MU/L (ref 0.4–4)
WBC # BLD AUTO: 6.7 10E3/UL (ref 4–11)

## 2022-08-05 PROCEDURE — 85027 COMPLETE CBC AUTOMATED: CPT

## 2022-08-05 PROCEDURE — 84702 CHORIONIC GONADOTROPIN TEST: CPT

## 2022-08-05 PROCEDURE — 84443 ASSAY THYROID STIM HORMONE: CPT

## 2022-08-05 PROCEDURE — 80053 COMPREHEN METABOLIC PANEL: CPT

## 2022-08-05 PROCEDURE — 83001 ASSAY OF GONADOTROPIN (FSH): CPT

## 2022-08-05 PROCEDURE — 84144 ASSAY OF PROGESTERONE: CPT

## 2022-08-05 PROCEDURE — 36415 COLL VENOUS BLD VENIPUNCTURE: CPT

## 2022-08-05 PROCEDURE — 83002 ASSAY OF GONADOTROPIN (LH): CPT

## 2022-08-05 PROCEDURE — 82670 ASSAY OF TOTAL ESTRADIOL: CPT

## 2022-08-08 ENCOUNTER — LAB (OUTPATIENT)
Dept: LAB | Facility: CLINIC | Age: 42
End: 2022-08-08
Payer: COMMERCIAL

## 2022-08-08 DIAGNOSIS — Z31.83 ENCOUNTER FOR ASSISTED REPRODUCTIVE FERTILITY CYCLE: ICD-10-CM

## 2022-08-08 LAB
ESTRADIOL SERPL-MCNC: 941 PG/ML
LH SERPL-ACNC: 15.5 MIU/ML
PROGEST SERPL-MCNC: 0.3 NG/ML

## 2022-08-08 PROCEDURE — 83002 ASSAY OF GONADOTROPIN (LH): CPT

## 2022-08-08 PROCEDURE — 36415 COLL VENOUS BLD VENIPUNCTURE: CPT

## 2022-08-08 PROCEDURE — 82670 ASSAY OF TOTAL ESTRADIOL: CPT

## 2022-08-08 PROCEDURE — 84144 ASSAY OF PROGESTERONE: CPT

## 2022-08-10 ENCOUNTER — LAB (OUTPATIENT)
Dept: LAB | Facility: CLINIC | Age: 42
End: 2022-08-10
Payer: COMMERCIAL

## 2022-08-10 DIAGNOSIS — Z31.83 ENCOUNTER FOR ASSISTED REPRODUCTIVE FERTILITY CYCLE: ICD-10-CM

## 2022-08-10 LAB
ESTRADIOL SERPL-MCNC: 516 PG/ML
LH SERPL-ACNC: 12.4 MIU/ML
PROGEST SERPL-MCNC: 0.2 NG/ML

## 2022-08-10 PROCEDURE — 83002 ASSAY OF GONADOTROPIN (LH): CPT

## 2022-08-10 PROCEDURE — 36415 COLL VENOUS BLD VENIPUNCTURE: CPT

## 2022-08-10 PROCEDURE — 84144 ASSAY OF PROGESTERONE: CPT

## 2022-08-10 PROCEDURE — 82670 ASSAY OF TOTAL ESTRADIOL: CPT

## 2022-09-07 ENCOUNTER — LAB (OUTPATIENT)
Dept: LAB | Facility: CLINIC | Age: 42
End: 2022-09-07
Payer: COMMERCIAL

## 2022-09-07 DIAGNOSIS — Z31.83 ENCOUNTER FOR ASSISTED REPRODUCTIVE FERTILITY CYCLE: Primary | ICD-10-CM

## 2022-09-07 LAB
B-HCG SERPL-ACNC: <1 IU/L (ref 0–5)
ESTRADIOL SERPL-MCNC: <5 PG/ML
FSH SERPL-ACNC: 6.9 IU/L
LH SERPL-ACNC: 2.1 MIU/ML
PROGEST SERPL-MCNC: 0.1 NG/ML
TSH SERPL DL<=0.005 MIU/L-ACNC: 0.87 MU/L (ref 0.4–4)

## 2022-09-07 PROCEDURE — 84443 ASSAY THYROID STIM HORMONE: CPT

## 2022-09-07 PROCEDURE — 84144 ASSAY OF PROGESTERONE: CPT

## 2022-09-07 PROCEDURE — 82670 ASSAY OF TOTAL ESTRADIOL: CPT

## 2022-09-07 PROCEDURE — 84702 CHORIONIC GONADOTROPIN TEST: CPT

## 2022-09-07 PROCEDURE — 83001 ASSAY OF GONADOTROPIN (FSH): CPT

## 2022-09-07 PROCEDURE — 83002 ASSAY OF GONADOTROPIN (LH): CPT

## 2022-09-07 PROCEDURE — 36415 COLL VENOUS BLD VENIPUNCTURE: CPT

## 2022-09-10 ENCOUNTER — HEALTH MAINTENANCE LETTER (OUTPATIENT)
Age: 42
End: 2022-09-10

## 2022-09-14 ENCOUNTER — LAB (OUTPATIENT)
Dept: LAB | Facility: CLINIC | Age: 42
End: 2022-09-14
Payer: COMMERCIAL

## 2022-09-14 DIAGNOSIS — Z31.83 ENCOUNTER FOR ASSISTED REPRODUCTIVE FERTILITY CYCLE: ICD-10-CM

## 2022-09-14 LAB
ESTRADIOL SERPL-MCNC: 1344 PG/ML
LH SERPL-ACNC: 6.3 MIU/ML
PROGEST SERPL-MCNC: 0.2 NG/ML

## 2022-09-14 PROCEDURE — 84144 ASSAY OF PROGESTERONE: CPT

## 2022-09-14 PROCEDURE — 83002 ASSAY OF GONADOTROPIN (LH): CPT

## 2022-09-14 PROCEDURE — 82670 ASSAY OF TOTAL ESTRADIOL: CPT

## 2022-09-14 PROCEDURE — 36415 COLL VENOUS BLD VENIPUNCTURE: CPT

## 2022-09-30 ENCOUNTER — LAB (OUTPATIENT)
Dept: LAB | Facility: CLINIC | Age: 42
End: 2022-09-30
Payer: COMMERCIAL

## 2022-09-30 DIAGNOSIS — Z31.83 ENCOUNTER FOR ASSISTED REPRODUCTIVE FERTILITY CYCLE: ICD-10-CM

## 2022-09-30 DIAGNOSIS — Z32.00 POSSIBLE PREGNANCY, NOT CONFIRMED: Primary | ICD-10-CM

## 2022-09-30 LAB
B-HCG SERPL-ACNC: 5 IU/L (ref 0–5)
PROGEST SERPL-MCNC: 34 NG/ML

## 2022-09-30 PROCEDURE — 84702 CHORIONIC GONADOTROPIN TEST: CPT

## 2022-09-30 PROCEDURE — 84144 ASSAY OF PROGESTERONE: CPT

## 2022-09-30 PROCEDURE — 36415 COLL VENOUS BLD VENIPUNCTURE: CPT

## 2022-10-03 ENCOUNTER — LAB (OUTPATIENT)
Dept: LAB | Facility: CLINIC | Age: 42
End: 2022-10-03
Payer: COMMERCIAL

## 2022-10-03 DIAGNOSIS — Z32.01 PREGNANCY EXAMINATION OR TEST, POSITIVE RESULT: Primary | ICD-10-CM

## 2022-10-03 LAB
B-HCG SERPL-ACNC: 2 IU/L (ref 0–5)
ESTRADIOL SERPL-MCNC: 384 PG/ML
PROGEST SERPL-MCNC: 29.8 NG/ML
TSH SERPL DL<=0.005 MIU/L-ACNC: 1.05 MU/L (ref 0.4–4)

## 2022-10-03 PROCEDURE — 82670 ASSAY OF TOTAL ESTRADIOL: CPT

## 2022-10-03 PROCEDURE — 84144 ASSAY OF PROGESTERONE: CPT

## 2022-10-03 PROCEDURE — 84443 ASSAY THYROID STIM HORMONE: CPT

## 2022-10-03 PROCEDURE — 84702 CHORIONIC GONADOTROPIN TEST: CPT

## 2022-10-03 PROCEDURE — 36415 COLL VENOUS BLD VENIPUNCTURE: CPT

## 2022-10-11 ENCOUNTER — LAB (OUTPATIENT)
Dept: LAB | Facility: CLINIC | Age: 42
End: 2022-10-11
Payer: COMMERCIAL

## 2022-10-11 DIAGNOSIS — Z31.83 ENCOUNTER FOR ASSISTED REPRODUCTIVE FERTILITY CYCLE: Primary | ICD-10-CM

## 2022-10-11 LAB
B-HCG SERPL-ACNC: <1 IU/L (ref 0–5)
ESTRADIOL SERPL-MCNC: 27 PG/ML
FSH SERPL-ACNC: 14.2 IU/L
PROGEST SERPL-MCNC: 0.2 NG/ML
TSH SERPL DL<=0.005 MIU/L-ACNC: 1.08 MU/L (ref 0.4–4)

## 2022-10-11 PROCEDURE — 82670 ASSAY OF TOTAL ESTRADIOL: CPT

## 2022-10-11 PROCEDURE — 99000 SPECIMEN HANDLING OFFICE-LAB: CPT

## 2022-10-11 PROCEDURE — 36415 COLL VENOUS BLD VENIPUNCTURE: CPT

## 2022-10-11 PROCEDURE — 83001 ASSAY OF GONADOTROPIN (FSH): CPT

## 2022-10-11 PROCEDURE — 83002 ASSAY OF GONADOTROPIN (LH): CPT | Mod: 90

## 2022-10-11 PROCEDURE — 84144 ASSAY OF PROGESTERONE: CPT

## 2022-10-11 PROCEDURE — 84443 ASSAY THYROID STIM HORMONE: CPT

## 2022-10-11 PROCEDURE — 84702 CHORIONIC GONADOTROPIN TEST: CPT

## 2022-10-18 ENCOUNTER — LAB (OUTPATIENT)
Dept: LAB | Facility: CLINIC | Age: 42
End: 2022-10-18
Payer: COMMERCIAL

## 2022-10-18 DIAGNOSIS — Z31.83 ENCOUNTER FOR ASSISTED REPRODUCTIVE FERTILITY CYCLE: Primary | ICD-10-CM

## 2022-10-18 LAB
ESTRADIOL SERPL-MCNC: 671 PG/ML
LH SERPL-ACNC: 5.4 MIU/ML
PROGEST SERPL-MCNC: 0.1 NG/ML

## 2022-10-18 PROCEDURE — 36415 COLL VENOUS BLD VENIPUNCTURE: CPT

## 2022-10-18 PROCEDURE — 83002 ASSAY OF GONADOTROPIN (LH): CPT

## 2022-10-18 PROCEDURE — 84144 ASSAY OF PROGESTERONE: CPT

## 2022-10-18 PROCEDURE — 82670 ASSAY OF TOTAL ESTRADIOL: CPT

## 2022-11-02 ENCOUNTER — LAB (OUTPATIENT)
Dept: LAB | Facility: CLINIC | Age: 42
End: 2022-11-02
Payer: COMMERCIAL

## 2022-11-02 DIAGNOSIS — Z32.00 POSSIBLE PREGNANCY, NOT CONFIRMED: Primary | ICD-10-CM

## 2022-11-02 LAB
B-HCG SERPL-ACNC: 177 IU/L (ref 0–5)
PROGEST SERPL-MCNC: 20.8 NG/ML

## 2022-11-02 PROCEDURE — 36415 COLL VENOUS BLD VENIPUNCTURE: CPT

## 2022-11-02 PROCEDURE — 84144 ASSAY OF PROGESTERONE: CPT

## 2022-11-02 PROCEDURE — 84702 CHORIONIC GONADOTROPIN TEST: CPT

## 2022-11-04 ENCOUNTER — LAB (OUTPATIENT)
Dept: LAB | Facility: CLINIC | Age: 42
End: 2022-11-04
Payer: COMMERCIAL

## 2022-11-04 DIAGNOSIS — Z32.01 PREGNANCY EXAMINATION OR TEST, POSITIVE RESULT: Primary | ICD-10-CM

## 2022-11-04 LAB
B-HCG SERPL-ACNC: 377 IU/L (ref 0–5)
ESTRADIOL SERPL-MCNC: 1041 PG/ML
PROGEST SERPL-MCNC: 18.5 NG/ML
TSH SERPL DL<=0.005 MIU/L-ACNC: 1.61 MU/L (ref 0.4–4)

## 2022-11-04 PROCEDURE — 36415 COLL VENOUS BLD VENIPUNCTURE: CPT

## 2022-11-04 PROCEDURE — 84144 ASSAY OF PROGESTERONE: CPT

## 2022-11-04 PROCEDURE — 84702 CHORIONIC GONADOTROPIN TEST: CPT

## 2022-11-04 PROCEDURE — 82670 ASSAY OF TOTAL ESTRADIOL: CPT

## 2022-11-04 PROCEDURE — 84443 ASSAY THYROID STIM HORMONE: CPT

## 2022-11-11 ENCOUNTER — LAB (OUTPATIENT)
Dept: LAB | Facility: CLINIC | Age: 42
End: 2022-11-11
Payer: COMMERCIAL

## 2022-11-11 DIAGNOSIS — O09.00 PREGNANCY WITH HISTORY OF INFERTILITY: Primary | ICD-10-CM

## 2022-11-11 LAB
B-HCG SERPL-ACNC: 4375 IU/L (ref 0–5)
ESTRADIOL SERPL-MCNC: 443 PG/ML
PROGEST SERPL-MCNC: 30 NG/ML

## 2022-11-11 PROCEDURE — 84702 CHORIONIC GONADOTROPIN TEST: CPT

## 2022-11-11 PROCEDURE — 84144 ASSAY OF PROGESTERONE: CPT

## 2022-11-11 PROCEDURE — 82670 ASSAY OF TOTAL ESTRADIOL: CPT

## 2022-11-11 PROCEDURE — 36415 COLL VENOUS BLD VENIPUNCTURE: CPT

## 2022-11-18 ENCOUNTER — LAB (OUTPATIENT)
Dept: LAB | Facility: CLINIC | Age: 42
End: 2022-11-18
Payer: COMMERCIAL

## 2022-11-18 DIAGNOSIS — O09.00 PREGNANCY WITH HISTORY OF INFERTILITY: Primary | ICD-10-CM

## 2022-11-18 LAB
B-HCG SERPL-ACNC: ABNORMAL IU/L (ref 0–5)
ESTRADIOL SERPL-MCNC: 495 PG/ML
LUTROPIN (EXTERNAL): 2.8 MIU/ML
PROGEST SERPL-MCNC: 19.3 NG/ML

## 2022-11-18 PROCEDURE — 82670 ASSAY OF TOTAL ESTRADIOL: CPT

## 2022-11-18 PROCEDURE — 84144 ASSAY OF PROGESTERONE: CPT

## 2022-11-18 PROCEDURE — 84702 CHORIONIC GONADOTROPIN TEST: CPT

## 2022-11-18 PROCEDURE — 36415 COLL VENOUS BLD VENIPUNCTURE: CPT

## 2022-11-28 ENCOUNTER — TRANSFERRED RECORDS (OUTPATIENT)
Dept: HEALTH INFORMATION MANAGEMENT | Facility: CLINIC | Age: 42
End: 2022-11-28

## 2022-11-28 ENCOUNTER — LAB (OUTPATIENT)
Dept: LAB | Facility: CLINIC | Age: 42
End: 2022-11-28
Payer: COMMERCIAL

## 2022-11-28 DIAGNOSIS — O09.00 PREGNANCY WITH HISTORY OF INFERTILITY: Primary | ICD-10-CM

## 2022-11-28 LAB
B-HCG SERPL-ACNC: ABNORMAL IU/L (ref 0–5)
ESTRADIOL SERPL-MCNC: 1177 PG/ML
PROGEST SERPL-MCNC: 23 NG/ML

## 2022-11-28 PROCEDURE — 36415 COLL VENOUS BLD VENIPUNCTURE: CPT

## 2022-11-28 PROCEDURE — 84702 CHORIONIC GONADOTROPIN TEST: CPT

## 2022-11-28 PROCEDURE — 82670 ASSAY OF TOTAL ESTRADIOL: CPT

## 2022-11-28 PROCEDURE — 84144 ASSAY OF PROGESTERONE: CPT

## 2022-12-13 ENCOUNTER — MEDICAL CORRESPONDENCE (OUTPATIENT)
Dept: HEALTH INFORMATION MANAGEMENT | Facility: CLINIC | Age: 42
End: 2022-12-13

## 2023-01-19 ENCOUNTER — MEDICAL CORRESPONDENCE (OUTPATIENT)
Dept: HEALTH INFORMATION MANAGEMENT | Facility: CLINIC | Age: 43
End: 2023-01-19
Payer: COMMERCIAL

## 2023-01-22 ENCOUNTER — TRANSCRIBE ORDERS (OUTPATIENT)
Dept: MATERNAL FETAL MEDICINE | Facility: CLINIC | Age: 43
End: 2023-01-22
Payer: COMMERCIAL

## 2023-01-22 DIAGNOSIS — O26.90 PREGNANCY RELATED CONDITION: Primary | ICD-10-CM

## 2023-02-17 ENCOUNTER — PRE VISIT (OUTPATIENT)
Dept: MATERNAL FETAL MEDICINE | Facility: CLINIC | Age: 43
End: 2023-02-17
Payer: COMMERCIAL

## 2023-02-21 ENCOUNTER — OFFICE VISIT (OUTPATIENT)
Dept: MATERNAL FETAL MEDICINE | Facility: CLINIC | Age: 43
End: 2023-02-21
Attending: OBSTETRICS & GYNECOLOGY
Payer: COMMERCIAL

## 2023-02-21 ENCOUNTER — HOSPITAL ENCOUNTER (OUTPATIENT)
Dept: ULTRASOUND IMAGING | Facility: CLINIC | Age: 43
Discharge: HOME | End: 2023-02-21
Attending: OBSTETRICS & GYNECOLOGY
Payer: COMMERCIAL

## 2023-02-21 DIAGNOSIS — O09.812 PREGNANCY RESULTING FROM IN VITRO FERTILIZATION IN SECOND TRIMESTER: Primary | ICD-10-CM

## 2023-02-21 DIAGNOSIS — O26.90 PREGNANCY RELATED CONDITION: ICD-10-CM

## 2023-02-21 DIAGNOSIS — O09.512 AMA (ADVANCED MATERNAL AGE) PRIMIGRAVIDA 35+, SECOND TRIMESTER: Primary | ICD-10-CM

## 2023-02-21 DIAGNOSIS — O09.812 PREGNANCY RESULTING FROM IN VITRO FERTILIZATION IN SECOND TRIMESTER: ICD-10-CM

## 2023-02-21 PROCEDURE — 76811 OB US DETAILED SNGL FETUS: CPT

## 2023-02-21 PROCEDURE — 999N000069 HC STATISTIC GENETIC COUNSELING, < 16 MIN: Performed by: GENETIC COUNSELOR, MS

## 2023-02-21 PROCEDURE — 99203 OFFICE O/P NEW LOW 30 MIN: CPT | Mod: 25 | Performed by: OBSTETRICS & GYNECOLOGY

## 2023-02-21 PROCEDURE — 76811 OB US DETAILED SNGL FETUS: CPT | Mod: 26 | Performed by: OBSTETRICS & GYNECOLOGY

## 2023-02-21 NOTE — PROGRESS NOTES
"Please see \"Imaging\" tab under \"Chart Review\" for details of today's visit.    Allison Vazquez    "

## 2023-03-15 ENCOUNTER — OFFICE VISIT (OUTPATIENT)
Dept: MATERNAL FETAL MEDICINE | Facility: CLINIC | Age: 43
End: 2023-03-15
Attending: OBSTETRICS & GYNECOLOGY
Payer: COMMERCIAL

## 2023-03-15 ENCOUNTER — HOSPITAL ENCOUNTER (OUTPATIENT)
Dept: ULTRASOUND IMAGING | Facility: CLINIC | Age: 43
Discharge: HOME | End: 2023-03-15
Attending: OBSTETRICS & GYNECOLOGY
Payer: COMMERCIAL

## 2023-03-15 DIAGNOSIS — O26.90 PREGNANCY RELATED CONDITION: ICD-10-CM

## 2023-03-15 DIAGNOSIS — O09.812 PREGNANCY RESULTING FROM IN VITRO FERTILIZATION IN SECOND TRIMESTER: Primary | ICD-10-CM

## 2023-03-15 PROCEDURE — 93325 DOPPLER ECHO COLOR FLOW MAPG: CPT

## 2023-03-15 PROCEDURE — 93325 DOPPLER ECHO COLOR FLOW MAPG: CPT | Mod: 26 | Performed by: OBSTETRICS & GYNECOLOGY

## 2023-03-15 PROCEDURE — 76827 ECHO EXAM OF FETAL HEART: CPT | Mod: 26 | Performed by: OBSTETRICS & GYNECOLOGY

## 2023-03-15 PROCEDURE — 76825 ECHO EXAM OF FETAL HEART: CPT | Mod: 26 | Performed by: OBSTETRICS & GYNECOLOGY

## 2023-03-15 NOTE — PROGRESS NOTES
"Please see \"Imaging\" tab under \"Chart Review\" for details of today's US at the [facility].    Shaun Melgar MD  Maternal-Fetal Medicine      "

## 2023-04-25 DIAGNOSIS — H43.392 VITREOUS SYNERESIS OF LEFT EYE: Primary | ICD-10-CM

## 2023-05-01 ENCOUNTER — TELEPHONE (OUTPATIENT)
Dept: OPHTHALMOLOGY | Facility: CLINIC | Age: 43
End: 2023-05-01
Payer: COMMERCIAL

## 2023-05-01 NOTE — TELEPHONE ENCOUNTER
[facility] Call Center    Phone Message    May a detailed message be left on voicemail: yes     Reason for Call: Other: Patient has an apt with Dr Chaudhry on 5/8 and is currently in her 3rd trimester of pregnancy. Patient want to know if she should should still come in because she's pregnant. Please call patient back at [phone]. Thank you.     Action Taken: Message routed to:  [facility] Eye    Travel Screening: Not Applicable

## 2023-05-01 NOTE — TELEPHONE ENCOUNTER
Called and confirmed Stephanie can still come to her appointment that is scheduled or she can wait till after the baby is born     Sharon Perry Communication Facilitator on 5/1/2023 at 1:58 PM

## 2023-05-04 ENCOUNTER — TRANSFERRED RECORDS (OUTPATIENT)
Dept: HEALTH INFORMATION MANAGEMENT | Facility: CLINIC | Age: 43
End: 2023-05-04
Payer: COMMERCIAL

## 2023-05-08 ENCOUNTER — OFFICE VISIT (OUTPATIENT)
Dept: OPHTHALMOLOGY | Facility: CLINIC | Age: 43
End: 2023-05-08
Attending: OPHTHALMOLOGY
Payer: COMMERCIAL

## 2023-05-08 DIAGNOSIS — H43.392 VITREOUS SYNERESIS OF LEFT EYE: ICD-10-CM

## 2023-05-08 PROCEDURE — G0463 HOSPITAL OUTPT CLINIC VISIT: HCPCS | Performed by: OPHTHALMOLOGY

## 2023-05-08 PROCEDURE — 99207 FUNDUS PHOTOS OU (BOTH EYES): CPT | Mod: 26 | Performed by: OPHTHALMOLOGY

## 2023-05-08 PROCEDURE — 92250 FUNDUS PHOTOGRAPHY W/I&R: CPT | Performed by: OPHTHALMOLOGY

## 2023-05-08 PROCEDURE — 92134 CPTRZ OPH DX IMG PST SGM RTA: CPT | Performed by: OPHTHALMOLOGY

## 2023-05-08 PROCEDURE — 99213 OFFICE O/P EST LOW 20 MIN: CPT | Mod: GC | Performed by: OPHTHALMOLOGY

## 2023-05-08 RX ORDER — PRENATAL VIT/IRON FUM/FOLIC AC 27MG-0.8MG
3 TABLET ORAL DAILY
COMMUNITY

## 2023-05-08 ASSESSMENT — REFRACTION_MANIFEST
OS_AXIS: 075
OS_ADD: +1.00
OS_SPHERE: -1.00
OS_CYLINDER: +0.25
OD_AXIS: 145
OD_SPHERE: -0.75
OD_ADD: +1.00
OD_CYLINDER: +0.25

## 2023-05-08 ASSESSMENT — SLIT LAMP EXAM - LIDS
COMMENTS: NORMAL
COMMENTS: NORMAL

## 2023-05-08 ASSESSMENT — TONOMETRY
OD_IOP_MMHG: 19
OS_IOP_MMHG: 20
IOP_METHOD: TONOPEN

## 2023-05-08 ASSESSMENT — EXTERNAL EXAM - LEFT EYE: OS_EXAM: NORMAL

## 2023-05-08 ASSESSMENT — CONF VISUAL FIELD
OS_INFERIOR_NASAL_RESTRICTION: 0
OD_SUPERIOR_NASAL_RESTRICTION: 0
OD_NORMAL: 1
METHOD: COUNTING FINGERS
OD_INFERIOR_NASAL_RESTRICTION: 0
OS_INFERIOR_TEMPORAL_RESTRICTION: 0
OS_SUPERIOR_NASAL_RESTRICTION: 0
OD_INFERIOR_TEMPORAL_RESTRICTION: 0
OD_SUPERIOR_TEMPORAL_RESTRICTION: 0
OS_NORMAL: 1
OS_SUPERIOR_TEMPORAL_RESTRICTION: 0

## 2023-05-08 ASSESSMENT — VISUAL ACUITY
METHOD: SNELLEN - LINEAR
OD_SC+: -1
OS_SC: 20/20
OS_SC+: +1
OD_SC: 20/20
METHOD_MR: PT REQUESTS MRX TODAY.

## 2023-05-08 ASSESSMENT — CUP TO DISC RATIO
OS_RATIO: 0.2
OD_RATIO: 0.2

## 2023-05-08 ASSESSMENT — EXTERNAL EXAM - RIGHT EYE: OD_EXAM: NORMAL

## 2023-05-08 NOTE — NURSING NOTE
Chief Complaints and History of Present Illnesses   Patient presents with     Follow Up     1 year follow up vitreous syneresis OU     Chief Complaint(s) and History of Present Illness(es)     Follow Up            Comments: 1 year follow up vitreous syneresis OU          Comments    Pt states vision  has been good since last year. No eye pain today. No new flashes or floaters.  Occasional dryness in each eye, relief with artificial tears. No redness.    ADELE Pacheco May 8, 2023 9:28 AM

## 2023-05-08 NOTE — PROGRESS NOTES
CC -   Vitreous syneresis with floaters    INTERVAL HISTORY -  VA stable, no new F/F, stable floaters      PMH -   Stephanie Bradshaw is a  41 year old year-old patient with a hx of hypothyroidism (on synthroid) and a history of Myopia  & floaters left eye. Evaluated in September 2021 for new floaters x 1 week ~9/10/2021 with stable vision, without trauma or flashes. Noted to have vitreous syneresis in both eyes with a small DBH in the right eye.  Occasional anemia seen by PMD  No HTn, no DM    PAST OCULAR SURGERY  None     RETINAL IMAGING:  OCT 05/08/23  OD - retina normal, PHF attached stable.  OS - reitna normal, PHF attached stable.    Optos photos 05/08/23  OD - Consistent with exam.   OS - Consistent with exam.    ASSESSMENT & PLAN    # vitreous syneresis OU   - new floaters OS ~ 9/10/21 stable 5/3/22 and 05/08/23   - advised S/Sx RD  5/2023   - recheck 1 year      # h/o DBH right eye, resolved   - single tiny SN resolved   - patient has h/o occasional anemia Hg 13.4 12/2021        # lattice & CR scar OS   - Stable.         return to clinic: 1 year, OCT OU        Kian Bravo MD  Ophthalmology, PGY-3          ATTESTATION     Attending Attestation:     Complete documentation of historical and exam elements from today's encounter can be found in the full encounter summary report (not reduplicated in this progress note).  I personally obtained the chief complaint(s) and history of present illness.  I confirmed and edited as necessary the review of systems, past medical/surgical history, family history, social history, and examination findings as documented by others; and I examined the patient myself.  I personally reviewed the relevant tests, images, and reports as documented above.  I personally reviewed the ophthalmic test(s) associated with this encounter, agree with the interpretation(s) as documented by the resident/fellow, and have edited the corresponding report(s) as necessary.   I formulated and edited  as necessary the assessment and plan and discussed the findings and management plan with the patient and family    Sadia Chaudhry MD, PhD  , Vitreoretinal Surgery  Department of Ophthalmology  [facility]

## 2023-07-20 DIAGNOSIS — R53.82 CHRONIC FATIGUE: ICD-10-CM

## 2023-07-20 DIAGNOSIS — N94.3 PREMENSTRUAL SYNDROME: ICD-10-CM

## 2023-07-23 ENCOUNTER — HEALTH MAINTENANCE LETTER (OUTPATIENT)
Age: 43
End: 2023-07-23

## 2023-09-26 ASSESSMENT — ENCOUNTER SYMPTOMS
FEVER: 0
PARESTHESIAS: 0
HEADACHES: 0
WEAKNESS: 0
EYE PAIN: 0
CHILLS: 0
NAUSEA: 0
SHORTNESS OF BREATH: 0
DYSURIA: 0
HEMATURIA: 0
ABDOMINAL PAIN: 0
NERVOUS/ANXIOUS: 0
HEARTBURN: 0
HEMATOCHEZIA: 0
ARTHRALGIAS: 0
FREQUENCY: 0
MYALGIAS: 0
CONSTIPATION: 0
BREAST MASS: 0
PALPITATIONS: 0
JOINT SWELLING: 0
DIARRHEA: 0
SORE THROAT: 0
COUGH: 0
DIZZINESS: 0

## 2023-09-26 ASSESSMENT — ASTHMA QUESTIONNAIRES: ACT_TOTALSCORE: 25

## 2023-09-27 ENCOUNTER — OFFICE VISIT (OUTPATIENT)
Dept: FAMILY MEDICINE | Facility: CLINIC | Age: 43
End: 2023-09-27
Payer: COMMERCIAL

## 2023-09-27 VITALS
HEART RATE: 67 BPM | HEIGHT: 64 IN | DIASTOLIC BLOOD PRESSURE: 73 MMHG | RESPIRATION RATE: 15 BRPM | SYSTOLIC BLOOD PRESSURE: 109 MMHG | BODY MASS INDEX: 31.58 KG/M2 | WEIGHT: 185 LBS | TEMPERATURE: 97.5 F | OXYGEN SATURATION: 98 %

## 2023-09-27 DIAGNOSIS — Z12.83 SKIN CANCER SCREENING: ICD-10-CM

## 2023-09-27 DIAGNOSIS — Z13.1 SCREENING FOR DIABETES MELLITUS: ICD-10-CM

## 2023-09-27 DIAGNOSIS — R53.82 CHRONIC FATIGUE: ICD-10-CM

## 2023-09-27 DIAGNOSIS — E03.9 HYPOTHYROIDISM, UNSPECIFIED TYPE: ICD-10-CM

## 2023-09-27 DIAGNOSIS — N94.3 PREMENSTRUAL SYNDROME: ICD-10-CM

## 2023-09-27 DIAGNOSIS — Z00.00 ROUTINE GENERAL MEDICAL EXAMINATION AT A HEALTH CARE FACILITY: ICD-10-CM

## 2023-09-27 DIAGNOSIS — Z13.220 LIPID SCREENING: ICD-10-CM

## 2023-09-27 LAB
CHOLEST SERPL-MCNC: 180 MG/DL
HBA1C MFR BLD: 5.6 % (ref 0–5.6)
HDLC SERPL-MCNC: 68 MG/DL
LDLC SERPL CALC-MCNC: 95 MG/DL
NONHDLC SERPL-MCNC: 112 MG/DL
TRIGL SERPL-MCNC: 83 MG/DL
TSH SERPL DL<=0.005 MIU/L-ACNC: 0.6 UIU/ML (ref 0.3–4.2)
VIT D+METAB SERPL-MCNC: 45 NG/ML (ref 20–50)

## 2023-09-27 PROCEDURE — 99213 OFFICE O/P EST LOW 20 MIN: CPT | Mod: 25 | Performed by: FAMILY MEDICINE

## 2023-09-27 PROCEDURE — 80061 LIPID PANEL: CPT | Performed by: FAMILY MEDICINE

## 2023-09-27 PROCEDURE — 99396 PREV VISIT EST AGE 40-64: CPT | Mod: 25 | Performed by: FAMILY MEDICINE

## 2023-09-27 PROCEDURE — 86376 MICROSOMAL ANTIBODY EACH: CPT | Performed by: FAMILY MEDICINE

## 2023-09-27 PROCEDURE — 82306 VITAMIN D 25 HYDROXY: CPT | Performed by: FAMILY MEDICINE

## 2023-09-27 PROCEDURE — 90686 IIV4 VACC NO PRSV 0.5 ML IM: CPT | Performed by: FAMILY MEDICINE

## 2023-09-27 PROCEDURE — 36415 COLL VENOUS BLD VENIPUNCTURE: CPT | Performed by: FAMILY MEDICINE

## 2023-09-27 PROCEDURE — 84443 ASSAY THYROID STIM HORMONE: CPT | Performed by: FAMILY MEDICINE

## 2023-09-27 PROCEDURE — 83036 HEMOGLOBIN GLYCOSYLATED A1C: CPT | Performed by: FAMILY MEDICINE

## 2023-09-27 PROCEDURE — 90471 IMMUNIZATION ADMIN: CPT | Performed by: FAMILY MEDICINE

## 2023-09-27 RX ORDER — LEVOTHYROXINE SODIUM 100 MCG
100 TABLET ORAL DAILY
Qty: 90 TABLET | Refills: 3 | Status: SHIPPED | OUTPATIENT
Start: 2023-09-27 | End: 2024-09-30

## 2023-09-27 ASSESSMENT — ENCOUNTER SYMPTOMS
PARESTHESIAS: 0
HEADACHES: 0
CONSTIPATION: 0
PALPITATIONS: 0
SORE THROAT: 0
NAUSEA: 0
DIZZINESS: 0
EYE PAIN: 0
CHILLS: 0
HEMATOCHEZIA: 0
FREQUENCY: 0
MYALGIAS: 0
JOINT SWELLING: 0
ARTHRALGIAS: 0
HEARTBURN: 0
BREAST MASS: 0
DYSURIA: 0
DIARRHEA: 0
COUGH: 0
FEVER: 0
NERVOUS/ANXIOUS: 0
WEAKNESS: 0
ABDOMINAL PAIN: 0
HEMATURIA: 0
SHORTNESS OF BREATH: 0

## 2023-09-27 ASSESSMENT — PAIN SCALES - GENERAL: PAINLEVEL: NO PAIN (0)

## 2023-09-27 NOTE — PROGRESS NOTES
SUBJECTIVE:   CC: Stephanie is an 42 year old who presents for preventive health visit.       9/27/2023     2:18 PM   Additional Questions   Roomed by Catherine Smith       Healthy Habits:     Getting at least 3 servings of Calcium per day:  NO    Bi-annual eye exam:  Yes    Dental care twice a year:  NO    Sleep apnea or symptoms of sleep apnea:  None    Diet:  Vegetarian/vegan    Frequency of exercise:  1 day/week    Duration of exercise:  Less than 15 minutes    Taking medications regularly:  Yes    Medication side effects:  None    Additional concerns today:  No    Paternal grandfather and paternal uncle diagnosed with colon cancer. Not in contact with Dad, unsure of his history. Unsure but thinks family was diagnosed at age 50-60's. She was seen by genetic screening.      Genetic counselor recommendations 4/21/2020 -   Screening:  Based on this negative test result, it is important for Stephanie and her relatives to refer back to the family history for appropriate cancer screening.    Based on the personal and family history information she provided, Stephanie does not meet current National Comprehensive Cancer Network (NCCN) guidelines for high risk breast screening based on the GRICLEDA Risk Evaluator v8 model. As such, Stephanie should continue with routine breast imaging. In addition, Stephanie should be receiving clinical breast exams by her physician. Her risks may change over time (due to personal or family history factors), therefore, she is encouraged to keep in touch with me to discuss breast cancer screening options in the future.   She should also continue with routine gynecologic exams, skin exams, and colonoscopy screening. Other population cancer screening options, such as those recommended by the American Cancer Society and the National Comprehensive Cancer Network (NCCN), are also appropriate for Stephanie and her family. These screening recommendations may change if there are changes to Stephanie's personal and/or  family history of cancer. Final screening recommendations should be made by each individual's managing physician.      Social History     Tobacco Use    Smoking status: Former     Packs/day: 0.00     Years: 0.00     Pack years: 0.00     Types: Cigarettes     Quit date: 2009     Years since quittin.6    Smokeless tobacco: Never   Substance Use Topics    Alcohol use: Yes     Comment: rarely           2023     8:13 PM   Alcohol Use   Prescreen: >3 drinks/day or >7 drinks/week? No          No data to display              Reviewed orders with patient.  Reviewed health maintenance and updated orders accordingly - Yes      Breast Cancer Screening:    FHS-7:       2022     3:26 PM   Breast CA Risk Assessment (FHS-7)   Did any of your first-degree relatives have breast or ovarian cancer? No   Did any of your relatives have bilateral breast cancer? No   Did any man in your family have breast cancer? No   Did any woman in your family have breast and ovarian cancer? No   Did any woman in your family have breast cancer before age 50 y? Yes   Do you have 2 or more relatives with breast and/or ovarian cancer? Yes   Do you have 2 or more relatives with breast and/or bowel cancer? Yes     Pertinent mammograms are reviewed under the imaging tab.    History of abnormal Pap smear: NO - age 30-65 PAP every 5 years with negative HPV co-testing recommended      Latest Ref Rng & Units 10/21/2020     6:00 PM 10/21/2020     5:54 PM   PAP / HPV   PAP (Historical)   NIL    HPV 16 DNA NEG^Negative Negative     HPV 18 DNA NEG^Negative Negative     Other HR HPV NEG^Negative Negative       Reviewed and updated as needed this visit by clinical staff   Tobacco  Allergies  Meds              Reviewed and updated as needed this visit by Provider                     Review of Systems   Constitutional:  Negative for chills and fever.   HENT:  Negative for congestion, ear pain, hearing loss and sore throat.    Eyes:  Negative for  "pain and visual disturbance.   Respiratory:  Negative for cough and shortness of breath.    Cardiovascular:  Negative for chest pain, palpitations and peripheral edema.   Gastrointestinal:  Negative for abdominal pain, constipation, diarrhea, heartburn, hematochezia and nausea.   Breasts:  Negative for tenderness, breast mass and discharge.   Genitourinary:  Positive for pelvic pain. Negative for dysuria, frequency, genital sores, hematuria, urgency, vaginal bleeding and vaginal discharge.   Musculoskeletal:  Negative for arthralgias, joint swelling and myalgias.   Skin:  Negative for rash.   Neurological:  Negative for dizziness, weakness, headaches and paresthesias.   Psychiatric/Behavioral:  Negative for mood changes. The patient is not nervous/anxious.           OBJECTIVE:   LMP 08/29/2023 (Exact Date)   Breastfeeding Yes   Physical Exam  /73 (BP Location: Right arm, Patient Position: Sitting, Cuff Size: Adult Regular)   Pulse 67   Temp 97.5  F (36.4  C) (Temporal)   Resp 15   Ht 1.62 m (5' 3.78\")   Wt 83.9 kg (185 lb)   LMP 08/29/2023 (Exact Date)   SpO2 98%   Breastfeeding Yes   BMI 31.97 kg/m             ASSESSMENT/PLAN:     Routine general medical examination at a health care facility  - up to date with pap smear  - defer mammogram as she is breastfeeding  - Due to family history no increased colon cancer risk, routine screening at age 45.    - INFLUENZA VACCINE IM > 6 MONTHS VALENT IIV4 (AFLURIA/FLUZONE)  - Vitamin D Deficiency; Future  - Vitamin D Deficiency    Chronic fatigue  - sertraline (ZOLOFT) 50 MG tablet; TAKE ONE AND ONE-HALF TABLETS BY MOUTH EVERY DAY  Dispense: 135 tablet; Refill: 3  - SYNTHROID 100 MCG tablet; Take 1 tablet (100 mcg) by mouth daily  Dispense: 90 tablet; Refill: 3    Premenstrual syndrome  - sertraline (ZOLOFT) 50 MG tablet; TAKE ONE AND ONE-HALF TABLETS BY MOUTH EVERY DAY  Dispense: 135 tablet; Refill: 3    Hypothyroidism, unspecified type  - SYNTHROID 100 MCG " tablet; Take 1 tablet (100 mcg) by mouth daily  Dispense: 90 tablet; Refill: 3  - Hemoglobin A1c; Future  - Thyroid peroxidase antibody; Future  - Hemoglobin A1c  - Thyroid peroxidase antibody    Lipid screening  - Lipid panel reflex to direct LDL Non-fasting; Future  - Lipid panel reflex to direct LDL Non-fasting    Screening for diabetes mellitus  - TSH with free T4 reflex; Future  - TSH with free T4 reflex    Skin cancer screening  - Adult Dermatology Referral; Future       Patient has been advised of split billing requirements and indicates understanding: Yes      COUNSELING:  Reviewed preventive health counseling, as reflected in patient instructions        She reports that she quit smoking about 14 years ago. Her smoking use included cigarettes. She has never used smokeless tobacco.          Sophia Caal MD  [facility]

## 2023-09-28 LAB — THYROPEROXIDASE AB SERPL-ACNC: <10 IU/ML

## 2023-10-30 NOTE — PROGRESS NOTES
[facility]  Genetic Counseling Consult    Patient:  Stephanie Bradshaw YOB: 1980   Date of Service:  23   MRN: [id number]    Stephanie was seen at the [facility] for genetic consultation. The indication for genetic counseling is IVF pregnancy. The patient was accompanied to this visit by their partner, Pedro Pablo. The patient and their accompanied individual is wearing a mask due to current [facility] policies.      The session was conducted in English.      IMPRESSION/ PLAN   1. Stephanie had genetic screening earlier in this pregnancy. Their non-invasive prenatal test was screen negative or low risk for screened conditions     2. During today's [facility] visit, Stephanie had a genetic counseling session only. Stephanie has already had genetic testing in this pregnancy. Additional screening and diagnostic testing was discussed for the gestational age and declined.    3. Stephanie had a level II comprehensive anatomy ultrasound today. Please see the ultrasound report for further details.    4. Further recommendations include a fetal echocardiogram with [facility] which is scheduled for 2023.    PREGNANCY HISTORY   /Parity:       CURRENT PREGNANCY   Current Age: 42 year old   Age at Delivery: 42 year old  CHICO: 2023, by Ultrasound                                   Gestational Age: 19w6d  This pregnancy is a single gestation.   This pregnancy was conceived with in vitro fertilization (IVF). The following was discussed:     Embryo transfer date: 10/24/2022    Number of transferred embryos: 1    Use of egg or sperm donor: Yes, egg donor, age 31 at time of retrieval     Age of egg retrieval: 31 (donor)    Frozen 5 day transfer    Preimplantation genetic testing (PGT) was not performed on the embryos     PGT-A is a screening test. Therefore, a normal PGT-A result significantly reduces but does not eliminate the possibility of  "aneuploidy. Invasive testing (such as amniocentesis) is recommended if the patient desires definitive information regarding aneuploidy in pregnancy. We discussed the limitation of NIPT following PGT-A and that pursuing multiple screening tests may result in conflicting information in which case the option of invasive testing would be reviewed again.    Fetal echocardiogram will be recommended and scheduled after the 18-20 week fetal anatomy ultrasound    The average pregnancy has a 0.5-1.0% chance of a congenital heart defect. However, studies suggest an increased chance for a heart defect for IVF pregnancies, with estimates ranging from 1-3.3%. Fetal echocardiograms are typically performed at 20 to 24 weeks gestation.    MEDICAL HISTORY   Stephanie s reported medical history is not expected to impact pregnancy management or risks to fetal development.       FAMILY HISTORY   A three-generation pedigree was obtained today and is scanned under the \"Media\" tab in Epic. The family history was reported by Stephanie and their partner.    The following significant findings were reported today:     The father of the pregnancy, Pedro Pablo, 39, is healthy. He was told he had a heart murmur as a child but has not been told that since. No problems    Pedro Pablo's family history is unremarkable       Stephanie has a family history of cancer. She has discussed this history with cancer genetic counseling and had NEGATIVE testing for the familial gene mutations. She declined taking her complete family history and had no concerns.     Otherwise, the reported family history is unremarkable for multiple miscarriages, stillbirths, birth defects, intellectual disabilities, known genetic conditions, and consanguinity.       CARRIER SCREENING   Expanded carrier screening is available to screen for autosomal recessive conditions and X-linked conditions in a large list of genes. Autosomal recessive conditions happen when a mutation has been inherited from " the egg and sperm and include conditions like cystic fibrosis, thalassemia, hearing loss, spinal muscular atrophy, and more. X-linked conditions happen when a mutation has been inherited from the egg and include conditions like fragile X syndrome.  screening was also reviewed. About [place] Screening    The patient's partner had previous carrier screening during their IVF work-up. They did not have any concerns given the donor's carrier screening results. A copy of the report was not available for review today.     RISK ASSESSMENT FOR CHROMOSOME CONDITIONS   We explained that the risk for fetal chromosome abnormalities increases with maternal age. We discussed specific features of common chromosome abnormalities, including Down syndrome, trisomy 13, trisomy 18, and sex chromosome trisomies.      At age 31 at midtrimester, the risk to have a baby with Down syndrome is 1 in 597.    At age 31 at midtrimester, the risk to have a baby with any chromosome abnormality is 1 in 299.     Stephanie had genetic screening earlier in this pregnancy. Their non-invasive prenatal test was screen negative or low risk for screened conditions     Non-invasive prenatal testing (NIPT) results    Maternal plasma cell-free DNA testing    Screens for fetal trisomy 21, trisomy 13, trisomy 18, and sex chromosome aneuploidy    First trimester ultrasound with nuchal translucency and nasal bone assessment was not performed in this pregnancy, to our knowledge.    Stephanie had a NIPT test earlier in pregnancy; we reviewed the results today, which are low risk, per patient report. Report was not available for our review    The NIPT did include sex chromosome aneuploidies and the result was low risk. The predicted sex is XY, which is typically male.    Given the accuracy of this test, these results greatly decrease the chance for certain fetal chromosome abnormalities    We discussed the limitations of normal NIPT results    Maternal serum AFP  only to screen for open neural tube defects (after 15 weeks) results were within normal limits per patient report. The result report was not available for our review.     GENETIC TESTING OPTIONS   Genetic testing during a pregnancy includes screening and diagnostic procedures.      Screening tests are non-invasive which means no risk to the pregnancy and includes ultrasounds and blood work. The benefits and limitations of screening were reviewed. Screening tests provide a risk assessment (chance) specific to the pregnancy for certain fetal chromosome abnormalities but cannot definitively diagnose or exclude a fetal chromosome abnormality. Follow-up genetic counseling and consideration of diagnostic testing is recommended with any abnormal screening result. Diagnostic testing during a pregnancy is more certain and can test for more conditions. However, the tests do have a risk of miscarriage that requires careful consideration. These tests can detect fetal chromosome abnormalities with greater than 99% certainty. Results can be compromised by maternal cell contamination or mosaicism and are limited by the resolution of current genetic testing technology.     There is no screening or diagnostic test that detects all forms of birth defects or intellectual disability.     We discussed the following ultrasound options:  Comprehensive level II ultrasound (Fetal Anatomy Ultrasound)    Ultrasound done between 18-20 weeks gestation    Screens for major birth defects and markers for aneuploidy (like trisomy 21 and trisomy 18)    Includes looking at the fetus/baby's growth, heart, organs (stomach, kidneys), placenta, and amniotic fluid    Fetal Echocardiogram    Ultrasound done between 22-24 weeks gestation    Screen for heart defects    Recommended if there are concerns about the heart or other indications like IVF pregnancy or maternal diabetes    We discussed the following diagnostic options:   Amniocentesis    Invasive  diagnostic procedure done after 15 weeks gestation    The procedure collects a small sample of amniotic fluid for the purpose of chromosomal testing and/or other genetic testing    Diagnostic result; more than 99% sensitivity for fetal chromosome abnormalities    Testing for AFP in the amniotic fluid can test for open neural tube defects    It was a pleasure to be involved with Stephanie s care. Face-to-face time of the meeting was 15 minutes.    Luana Urbina GC, MS, [facility]  Certified and [place] Licensed Genetic Counselor  [facility]  Maternal Fetal Medicine  Office: [phone]  [facility]: [phone]   Fax: [phone]  [facility]               No

## 2024-03-21 ENCOUNTER — VIRTUAL VISIT (OUTPATIENT)
Dept: FAMILY MEDICINE | Facility: CLINIC | Age: 44
End: 2024-03-21
Payer: COMMERCIAL

## 2024-03-21 DIAGNOSIS — J06.9 VIRAL URI: Primary | ICD-10-CM

## 2024-03-21 PROCEDURE — 99213 OFFICE O/P EST LOW 20 MIN: CPT | Mod: 95 | Performed by: PHYSICIAN ASSISTANT

## 2024-03-21 ASSESSMENT — ASTHMA QUESTIONNAIRES
QUESTION_4 LAST FOUR WEEKS HOW OFTEN HAVE YOU USED YOUR RESCUE INHALER OR NEBULIZER MEDICATION (SUCH AS ALBUTEROL): NOT AT ALL
ACT_TOTALSCORE: 25
QUESTION_5 LAST FOUR WEEKS HOW WOULD YOU RATE YOUR ASTHMA CONTROL: COMPLETELY CONTROLLED
QUESTION_3 LAST FOUR WEEKS HOW OFTEN DID YOUR ASTHMA SYMPTOMS (WHEEZING, COUGHING, SHORTNESS OF BREATH, CHEST TIGHTNESS OR PAIN) WAKE YOU UP AT NIGHT OR EARLIER THAN USUAL IN THE MORNING: NOT AT ALL
QUESTION_1 LAST FOUR WEEKS HOW MUCH OF THE TIME DID YOUR ASTHMA KEEP YOU FROM GETTING AS MUCH DONE AT WORK, SCHOOL OR AT HOME: NONE OF THE TIME
QUESTION_2 LAST FOUR WEEKS HOW OFTEN HAVE YOU HAD SHORTNESS OF BREATH: NOT AT ALL
ACT_TOTALSCORE: 25

## 2024-03-21 NOTE — LETTER
March 21, 2024      Stephanie Bradshaw  [address] [facility] [address]        To Whom It May Concern:    Stephanie Bradshaw was seen in our clinic on 3/21/24. She has been unable to attend work due to illness, please excuse absences 3/18/24-3/21/24. She may return to work without restrictions as of Monday, 3/25/24.      Sincerely,        Catherine Pisano PA-C

## 2024-03-21 NOTE — PROGRESS NOTES
"Stephanie is a 43 year old who is being evaluated via a billable video visit.    How would you like to obtain your AVS?   If the video visit is dropped, the invitation should be resent by:   Will anyone else be joining your video visit?       Assessment & Plan     Viral URI - work note provided. Continue with symptomatic management, currently breastfeeding and is aware of what she can/cannot take.      BMI  Estimated body mass index is 31.97 kg/m  as calculated from the following:    Height as of 9/27/23: 1.62 m (5' 3.78\").    Weight as of 9/27/23: 83.9 kg (185 lb).           Subjective   Stephanie is a 43 year old, presenting for the following health issues:  No chief complaint on file.    Will be back to work on Monday  Missed work Mon-Thurs this week  Symptoms improving  9 month old just started  and has been sick  Needs work note    History of Present Illness       Reason for visit:  Illness  Symptom onset:  1-3 days ago  Symptoms include:  Nasal congestion, nasal drainage, productive cough, headache, sinus pain  Symptom intensity:  Mild  Symptom progression:  Improving  Had these symptoms before:  Yes  Has tried/received treatment for these symptoms:  No    She eats 2-3 servings of fruits and vegetables daily.She consumes 0 sweetened beverage(s) daily.She exercises with enough effort to increase her heart rate 9 or less minutes per day.  She exercises with enough effort to increase her heart rate 3 or less days per week. She is missing 1 dose(s) of medications per week.  She is not taking prescribed medications regularly due to remembering to take.         Objective         Vitals:  No vitals were obtained today due to virtual visit.    Physical Exam   GENERAL: alert and no distress  EYES: Eyes grossly normal to inspection.  No discharge or erythema, or obvious scleral/conjunctival abnormalities.  RESP: No audible wheeze, cough, or visible cyanosis.    SKIN: Visible skin clear. No significant rash, abnormal " pigmentation or lesions.  NEURO: Cranial nerves grossly intact.  Mentation and speech appropriate for age.  PSYCH: Appropriate affect, tone, and pace of words          Video-Visit Details    Type of service:  Video Visit   Originating Location (pt. Location): Home    Distant Location (provider location):  On-site  Platform used for Video Visit: Charley  Signed Electronically by: Catherine Pisano PA-C

## 2024-04-01 ENCOUNTER — E-VISIT (OUTPATIENT)
Dept: URGENT CARE | Facility: CLINIC | Age: 44
End: 2024-04-01
Payer: COMMERCIAL

## 2024-04-01 DIAGNOSIS — H10.32 ACUTE BACTERIAL CONJUNCTIVITIS OF LEFT EYE: Primary | ICD-10-CM

## 2024-04-01 PROCEDURE — 99421 OL DIG E/M SVC 5-10 MIN: CPT | Performed by: NURSE PRACTITIONER

## 2024-04-01 RX ORDER — OFLOXACIN 3 MG/ML
SOLUTION/ DROPS OPHTHALMIC
Qty: 5 ML | Refills: 0 | Status: SHIPPED | OUTPATIENT
Start: 2024-04-01 | End: 2024-04-08

## 2024-04-01 NOTE — PATIENT INSTRUCTIONS
Thank you for choosing us for your care. I have placed an order for a prescription so that you can start treatment. View your full visit summary for details by clicking on the link below. Your pharmacist will able to address any questions you may have about the medication.     If you re not feeling better within 2-3 days, please schedule an appointment.  You can schedule an appointment right here in [facility], or call [phone]  If the visit is for the same symptoms as your eVisit, we ll refund the cost of your eVisit if seen within seven days.      If you are an not better in 2-3 days you should be evaluated in person

## 2024-04-16 ENCOUNTER — E-VISIT (OUTPATIENT)
Dept: URGENT CARE | Facility: CLINIC | Age: 44
End: 2024-04-16
Payer: COMMERCIAL

## 2024-04-16 DIAGNOSIS — H10.32 ACUTE BACTERIAL CONJUNCTIVITIS OF LEFT EYE: Primary | ICD-10-CM

## 2024-04-16 PROCEDURE — 99421 OL DIG E/M SVC 5-10 MIN: CPT | Performed by: EMERGENCY MEDICINE

## 2024-04-16 RX ORDER — POLYMYXIN B SULFATE AND TRIMETHOPRIM 1; 10000 MG/ML; [USP'U]/ML
SOLUTION OPHTHALMIC
Qty: 10 ML | Refills: 0 | Status: SHIPPED | OUTPATIENT
Start: 2024-04-16 | End: 2024-04-23

## 2024-04-16 NOTE — PATIENT INSTRUCTIONS
Thank you for choosing us for your care. I have placed an order for a prescription so that you can start treatment. View your full visit summary for details by clicking on the link below. Your pharmacist will able to address any questions you may have about the medication.     If you re not feeling better within 2-3 days, please schedule an appointment.  You can schedule an appointment right here in [facility], or call [phone]  If the visit is for the same symptoms as your eVisit, we ll refund the cost of your eVisit if seen within seven days.    Pinkeye: Care Instructions  Overview     Pinkeye is redness and swelling of the eye surface and the conjunctiva (the lining of the eyelid and the covering of the white part of the eye). Pinkeye is also called conjunctivitis. Pinkeye is often caused by infection with bacteria or a virus. Dry air, allergies, smoke, and chemicals are other common causes.  Pinkeye often gets better on its own in 7 to 10 days. Antibiotics only help if the pinkeye is caused by bacteria. Pinkeye caused by infection spreads easily. If an allergy or chemical is causing pinkeye, it will not go away unless you can avoid whatever is causing it.  Follow-up care is a key part of your treatment and safety. Be sure to make and go to all appointments, and call your doctor if you are having problems. It's also a good idea to know your test results and keep a list of the medicines you take.  How can you care for yourself at home?  Wash your hands often. Always wash them before and after you treat pinkeye or touch your eyes or face.  Use moist cotton or a clean, wet cloth to remove crust. Wipe from the inside corner of the eye to the outside. Use a clean part of the cloth for each wipe.  Put cold or warm wet cloths on your eye a few times a day if the eye hurts.  Do not wear contact lenses or eye makeup until the pinkeye is gone. Throw away any eye makeup you were using when you got pinkeye. Clean your  "contacts and storage case. If you wear disposable contacts, use a new pair when your eye has cleared and it is safe to wear contacts again.  If the doctor gave you antibiotic ointment or eyedrops, use them as directed. Use the medicine for as long as instructed, even if your eye starts looking better soon. Keep the bottle tip clean, and do not let it touch the eye area.  To put in eyedrops or ointment:  Tilt your head back, and pull your lower eyelid down with one finger.  Drop or squirt the medicine inside the lower lid.  Close your eye for 30 to 60 seconds to let the drops or ointment move around.  Do not touch the ointment or dropper tip to your eyelashes or any other surface.  Do not share towels, pillows, or washcloths while you have pinkeye.  When should you call for help?   Call your doctor now or seek immediate medical care if:    You have pain in your eye, not just irritation on the surface.     You have a change in vision or loss of vision.     You have an increase in discharge from the eye.     Your eye has not started to improve or begins to get worse within 48 hours after you start using antibiotics.     Pinkeye lasts longer than 7 days.   Watch closely for changes in your health, and be sure to contact your doctor if you have any problems.  Where can you learn more?  Go to https://www.[employer].net/patiented  Enter Y392 in the search box to learn more about \"Pinkeye: Care Instructions.\"  Current as of: June 5, 2023               Content Version: 14.0    9081-0711 [employer].   Care instructions adapted under license by your healthcare professional. If you have questions about a medical condition or this instruction, always ask your healthcare professional. [employer] disclaims any warranty or liability for your use of this information.       Taking Care of Pinkeye at Home (01:30)  Your health professional recommends that you watch this short online health video.  Learn ways " to ease the discomfort of pinkeye and keep the infection from spreading.  Purpose:  Describes basic home care for pinkeye to ease discomfort and prevent the spread of the infection.  Goal:  User will learn home treatment for pinkeye.     How to watch the video    Scan the QR code   OR Visit the website    https://hwi.se/[email or web address]   Current as of: June 5, 2023               Content Version: 14.0    8901-3388 [employer].   Care instructions adapted under license by your healthcare professional. If you have questions about a medical condition or this instruction, always ask your healthcare professional. [employer] disclaims any warranty or liability for your use of this information.

## 2024-04-24 NOTE — PATIENT INSTRUCTIONS
Wound Care After a Biopsy    What is a skin biopsy?  A skin biopsy allows the doctor to examine a very small piece of tissue under the microscope to determine the diagnosis and the best treatment for the skin condition. A local anesthetic (numbing medicine) is injected with a very small needle into the skin area to be tested. A small piece of skin is taken from the area. Sometimes a suture (stitch) is used.     What are the risks of a skin biopsy?  I will experience scar, bleeding, swelling, pain, crusting and redness. I may experience incomplete removal or recurrence. Risks of this procedure are excessive bleeding, bruising, infection, nerve damage, numbness, thick (hypertrophic or keloidal) scar and non-diagnostic biopsy.    How should I care for my wound for the first 24 hours?  Keep the wound dry and covered for 24 hours  If it bleeds, hold direct pressure on the area for 15 minutes. If bleeding does not stop then go to the emergency room  Avoid strenuous exercise the first 1-2 days or as your doctor instructs you.    How should I care for the wound after 24 hours?  After 24 hours, remove the bandage  You may bathe or shower as normal  If you had a scalp biopsy, you can shampoo as usual and can use shower water to clean the biopsy site daily  Clean the wound twice a day with gentle soap and water  Do not scrub, be gentle  Apply white petroleum/Vaseline after cleaning the wound with a cotton swab or a clean finger, and keep the site covered with a Bandaid /bandage. Bandages are not necessary with a scalp biopsy  If you are unable to cover the site with a Bandaid /bandage, re-apply ointment 2-3 times a day to keep the site moist. Moisture will help with healing  Avoid strenuous activity for first 1-2 days  Avoid lakes, rivers, pools, and oceans until the stitches are removed or the site is healed    How do I clean my wound?  Wash hands thoroughly with soap or use hand  before all wound care  Clean the  wound with gentle soap and water  Apply white petroleum/Vaseline  to wound after it is clean  Replace the Bandaid /bandage to keep the wound covered for the first few days or as instructed by your doctor  If you had a scalp biopsy, warm shower water to the area on a daily basis should suffice    What should I use to clean my wound?   Cotton-tipped applicators (Qtips )  White petroleum jelly (Vaseline ). Use a clean new container and use Q-tips to apply.  Bandaids  as needed  Gentle soap     How should I care for my wound long term?  Do not get your wound dirty  Keep up with wound care for one week or until the area is healed.  A small scab will form and fall off by itself when the area is completely healed. The area will be red and will become pink in color as it heals. Sun protection is very important for how your scar will turn out. Sunscreen with an SPF 30 or greater is recommended once the area is healed.  If you have stitches, stitches need to be removed in 7 days on the face/neck, or 10-14 days on the body days. You may return to our clinic for this or you may have it done locally at your doctor's office.  You should have some soreness but it should be mild and slowly go away over several days. Talk to your doctor about using tylenol for pain,    When should I call my doctor?  If you have increased:   Pain or swelling  Pus or drainage (clear or slightly yellow drainage is ok)  Temperature over 100F  Spreading redness or warmth around wound    What to expect for wound healing?  One week after surgery a pink / red halo will form around the outside of the wound.   This is new skin.  The center of the wound will appear yellowish white and produce some drainage.  The pink halo will slowly migrate in toward the center of the wound until the wound is covered with new shiny pink skin.  There will be no more drainage when the wound is completely healed.    It will take six months to one year for the redness to  fade.  The scar may be itchy, tight, and sensitive to extreme temperatures for a year after the surgery.  Massaging the area several times a day for several minutes after the wound is completely healed will help the scar soften and normalize faster. Begin massage only after healing is complete.    When will I hear about my results?  The biopsy results can take 2 weeks to come back.  Your results will automatically release to [employer] before your provider has even reviewed them.  The clinic will call you with the results, send you a [employer] message, or have you schedule a follow-up clinic or phone time to discuss the results.  Contact our clinics if you do not hear from us in 2 weeks.    Who should I call with questions?  [facility]: [phone]   Please call or send a [employer] message for questions or concerns.  If sending a [employer] message, please attach a photo of the wound to your message, if possible. This will help us better assist you.     For urgent needs outside of business hours call the [facility] at [phone] and ask for the dermatology resident on call       Cryotherapy    What is it?  Use of a very cold liquid, such as liquid nitrogen, to freeze and destroy abnormal skin cells that need to be removed.    What should I expect?  Tenderness and redness  A small blister that might grow and fill with dark purple blood. There may be crusting.  More than one treatment may be needed if the lesions do not go away.    How do I care for the treated area?  Gently wash the area with your hands when bathing.  Use a thin layer of Vaseline or Aquaphor to help with healing. You may use a Band-Aid.   The area should heal within 7-10 days and may leave behind a pink or lighter color.   Do not use an antibiotic or Neosporin ointment.   You may take acetaminophen (Tylenol) for pain.     Call your doctor if you have:  Questions or concerns  Severe pain  Signs of infection  Increasing:   Pain or  swelling  Pus or drainage (clear or slightly yellow drainage is ok)  Temperature over 100F  Spreading redness or warmth around wound    Call the clinic as soon as possible if experiencing any of the symptoms listed.        Who should I call with questions?  [facility]: [phone]    Please call or send a MyChart message for questions or concerns.  If sending a MyChart message, please attach a photo of the wound to your message, if possible. This will help us better assist you.         For urgent needs outside of business hours call the [facility] at [phone] and ask for the dermatology resident on call         Patient Education       Proper skin care from [facility]:    -Eliminate harsh soaps as they strip the natural oils from the skin, often resulting in dry itchy skin ( i.e. Dial, Zest, Cesilia Spring)  -Use mild soaps such as Cetaphil or Dove Sensitive Skin in the shower. You do not need to use soap on arms, legs, and trunk every time you shower unless visibly soiled.   -Avoid hot or cold showers.  -After showering, lightly dry off and apply moisturizing within 2-3 minutes. This will help trap moisture in the skin.   -Aggressive use of a moisturizer at least 1-2 times a day to the entire body (including -Vanicream, Cetaphil, Aquaphor or Cerave) and moisturize hands after every washing.  -We recommend using moisturizers that come in a tub that needs to be scooped out, not a pump. This has more of an oil base. It will hold moisture in your skin much better than a water base moisturizer. The above recommended are non-pore clogging.      Wear a sunscreen with at least SPF 30 on your face, ears, neck and V of the chest daily. Wear sunscreen on other areas of the body if those areas are exposed to the sun throughout the day. Sunscreens can contain physical and/or chemical blockers. Physical blockers are less likely to clog pores, these include zinc oxide and titanium dioxide.  Reapply every two hour and after swimming.     Sunscreen examples: https://www.ewg.org/sunscreen/    UV radiation  UVA radiation remains constant throughout the day and throughout the year. It is a longer wavelength than UVB and therefore penetrates deeper into the skin leading to immediate and delayed tanning, photoaging, and skin cancer. 70-80% of UVA and UVB radiation occurs between the hours of 10am-2pm.  UVB radiation  UVB radiation causes the most harmful effects and is more significant during the summer months. However, snow and ice can reflect UVB radiation leading to skin damage during the winter months as well. UVB radiation is responsible for tanning, burning, inflammation, delayed erythema (pinkness), pigmentation (brown spots), and skin cancer.     I recommend self monthly full body exams and yearly full body exams with a dermatology provider. If you develop a new or changing lesion please follow up for examination. Most skin cancers are pink and scaly or pink and pearly. However, we do see blue/brown/black skin cancers.  Consider the ABCDEs of melanoma when giving yourself your monthly full body exam ( don't forget the groin, buttocks, feet, toes, etc). A-asymmetry, B-borders, C-color, D-diameter, E-elevation or evolving. If you see any of these changes please follow up in clinic. If you cannot see your back I recommend purchasing a hand held mirror to use with a larger wall mirror.       Checking for Skin Cancer  You can find cancer early by checking your skin each month. There are 3 kinds of skin cancer. They are melanoma, basal cell carcinoma, and squamous cell carcinoma. Doing monthly skin checks is the best way to find new marks or skin changes. Follow the instructions below for checking your skin.   The ABCDEs of checking moles for melanoma   Check your moles or growths for signs of melanoma using ABCDE:   Asymmetry: the sides of the mole or growth don t match  Border: the edges are ragged,  notched, or blurred  Color: the color within the mole or growth varies  Diameter: the mole or growth is larger than 6 mm (size of a pencil eraser)  Evolving: the size, shape, or color of the mole or growth is changing (evolving is not shown in the images below)    Checking for other types of skin cancer  Basal cell carcinoma or squamous cell carcinoma have symptoms such as:     A spot or mole that looks different from all other marks on your skin  Changes in how an area feels, such as itching, tenderness, or pain  Changes in the skin's surface, such as oozing, bleeding, or scaliness  A sore that does not heal  New swelling or redness beyond the border of a mole    Who s at risk?  Anyone can get skin cancer. But you are at greater risk if you have:   Fair skin, light-colored hair, or light-colored eyes  Many moles or abnormal moles on your skin  A history of sunburns from sunlight or tanning beds  A family history of skin cancer  A history of exposure to radiation or chemicals  A weakened immune system  If you have had skin cancer in the past, you are at risk for recurring skin cancer.   How to check your skin  Do your monthly skin checkups in front of a full-length mirror. Check all parts of your body, including your:   Head (ears, face, neck, and scalp)  Torso (front, back, and sides)  Arms (tops, undersides, upper, and lower armpits)  Hands (palms, backs, and fingers, including under the nails)  Buttocks and genitals  Legs (front, back, and sides)  Feet (tops, soles, toes, including under the nails, and between toes)  If you have a lot of moles, take digital photos of them each month. Make sure to take photos both up close and from a distance. These can help you see if any moles change over time.   Most skin changes are not cancer. But if you see any changes in your skin, call your doctor right away. Only he or she can diagnose a problem. If you have skin cancer, seeing your doctor can be the first step toward  getting the treatment that could save your life.   [email or web address] last reviewed this educational content on 4/1/2019 2000-2020 [employer] [place]. [address] [place] [address], [place] [address]. All rights reserved. This information is not intended as a substitute for professional medical care. Always follow your healthcare professional's instructions.       When should I call my doctor?  If you are worsening or not improving, please, contact us or seek urgent care as noted below.     Who should I call with questions (adults)?  [facility] (adult and pediatric): [phone]  [facility] (adult): [phone]  [facility] ([facility] and [place]) [phone]  For urgent needs outside of business hours call the [facility] at [phone] and ask for the dermatology resident on call to be paged  If this is a medical emergency and you are unable to reach an ER, Call [phone]      If you need a prescription refill, please contact your pharmacy. Refills are approved or denied by our Physicians during normal business hours, Monday through Fridays  Per office policy, refills will not be granted if you have not been seen within the past year (or sooner depending on your child's condition)

## 2024-04-25 ENCOUNTER — OFFICE VISIT (OUTPATIENT)
Dept: DERMATOLOGY | Facility: CLINIC | Age: 44
End: 2024-04-25
Payer: COMMERCIAL

## 2024-04-25 DIAGNOSIS — D48.9 NEOPLASM OF UNCERTAIN BEHAVIOR: ICD-10-CM

## 2024-04-25 DIAGNOSIS — Z12.83 ENCOUNTER FOR SCREENING FOR MALIGNANT NEOPLASM OF SKIN: ICD-10-CM

## 2024-04-25 DIAGNOSIS — D22.9 MULTIPLE BENIGN NEVI: Primary | ICD-10-CM

## 2024-04-25 DIAGNOSIS — D18.01 CHERRY ANGIOMA: ICD-10-CM

## 2024-04-25 DIAGNOSIS — Z12.83 SKIN CANCER SCREENING: ICD-10-CM

## 2024-04-25 DIAGNOSIS — B00.1 RECURRENT COLD SORES: ICD-10-CM

## 2024-04-25 DIAGNOSIS — L82.1 SEBORRHEIC KERATOSES: ICD-10-CM

## 2024-04-25 DIAGNOSIS — L81.4 LENTIGINES: ICD-10-CM

## 2024-04-25 PROCEDURE — 88305 TISSUE EXAM BY PATHOLOGIST: CPT | Performed by: DERMATOLOGY

## 2024-04-25 PROCEDURE — 99204 OFFICE O/P NEW MOD 45 MIN: CPT | Mod: 25 | Performed by: NURSE PRACTITIONER

## 2024-04-25 PROCEDURE — 11102 TANGNTL BX SKIN SINGLE LES: CPT | Performed by: NURSE PRACTITIONER

## 2024-04-25 RX ORDER — VALACYCLOVIR HYDROCHLORIDE 1 G/1
2000 TABLET, FILM COATED ORAL 2 TIMES DAILY
Qty: 12 TABLET | Refills: 3 | Status: SHIPPED | OUTPATIENT
Start: 2024-04-25 | End: 2024-04-26

## 2024-04-25 NOTE — PROGRESS NOTES
[facility] Dermatology Note  Encounter Date: Apr 25, 2024  Office Visit     Reviewed patients past medical history and pertinent chart review prior to patients visit today.     Dermatology Problem List:  NUB mid low back, shave biopsy 04/25/24 .   Recurrent cold sores, valtrex given    ____________________________________________    Assessment & Plan:     # Neoplasm of uncertain behavior:  mid low back  DDx includes irritated nevus vs dysplasia. Shave biopsy today.    Procedure Note: Biopsy by shave technique  The risks and benefits of the procedure were described to the patient. These include but are not limited to bleeding, infection, scar, incomplete removal, and non-diagnostic biopsy. Verbal informed consent was obtained. The above site(s) was cleansed with an alcohol pad and injected with 1% lidocaine with epinephrine. Once anesthesia was obtained, a biopsy(ies) was performed with Gilette blade. The tissue(s) was placed in a labeled container(s) with formalin and sent to pathology. Hemostasis was achieved with aluminum chloride. Vaseline and a bandage were applied to the wound(s). The patient tolerated the procedure well and was given post biopsy care instructions.     # recurrent cold sores. Discussed antiviral treatment with outbreaks. Patient is interested in trying antivirals. Advised to take valtrex 2,000 mg at first sign of an outbreak, and 2,000 mg 12 hours later. Take with each outbreak. Discussed side effects of medication. If patient has more frequent outbreaks we may discuss viral suppression treatment  Discussed breastfeeding exposure to infant but according to [employer] still compatible with breastfeeding.     # Benign skin findings including:  cherry angioma, lentigines and benign nevi.   - No further intervention required. Patient to report changes.   - Patient reassured of the benign nature of these lesions.    #Signs and Symptoms of non-melanoma skin cancer and ABCDEs of  melanoma reviewed with patient. Patient encouraged to perform monthly self skin exams and educated on how to perform them. UV precautions reviewed with patient. Patient was asked about new or changing moles/lesions on body.     #Reviewed Sunscreen: Apply 20 minutes prior to going outdoors and reapply every two hours, when wet or sweating. We recommend using an SPF 30 or higher, and to use one that is water resistant.       Follow-up:  1-2 years for follow up full body skin exam, prn for new or changing lesions or new concerns    Kathie Palma CNP  Dermatology     ____________________________________________    CC: Skin Check (Mole and skin tag that would like removed. Moles increasing in size/Mother BCC)    HPI:  Ms. Stephanie Bradshaw is a(n) 43 year old female who presents today as a new patient for a full body skin cancer screening. Patient has concerns today about an irritated mole on her low back that she thinks may be growing. She would like it removed. She also has a cold sore right now, she has had these since she was a teen.     Patient is otherwise feeling well, without additional skin concerns.     Physical Exam:  Vitals: There were no vitals taken for this visit.  SKIN: Total skin excluding the genitalia areas was performed. The exam included the head/face, neck, both arms, chest, back, abdomen, both legs, digits, mons pubis, buttock and nails.   -right upper lip, healing erosion  -mid low back, 5 mm dome shaped soft pink/brown papule  -several 1-2mm red dome shaped symmetric papules scattered on the trunk  -multiple tan/brown flat round macules and raised papules scattered throughout trunk, extremities and head. No worrisome features for malignancy noted on examination.  -scattered tan, homogenous macules scattered on sun exposed areas of trunk, extremities and face.     - No other lesions of concern on areas examined.     Medications:  Current Outpatient Medications   Medication Sig Dispense Refill     valACYclovir (VALTREX) 1000 mg tablet Take 2 tablets (2,000 mg) by mouth 2 times daily for 1 day 12 tablet 3    albuterol (PROAIR HFA/PROVENTIL HFA/VENTOLIN HFA) 108 (90 Base) MCG/ACT inhaler Inhale 2 puffs into the lungs as needed      fluticasone (FLONASE) 50 MCG/ACT nasal spray Spray 2 sprays into both nostrils daily as needed 1 spray daily       Prenatal Vit-Fe Fumarate-FA (PRENATAL MULTIVITAMIN W/IRON) 27-0.8 MG tablet 3 tablets daily      sertraline (ZOLOFT) 50 MG tablet TAKE ONE AND ONE-HALF TABLETS BY MOUTH EVERY  tablet 3    SYNTHROID 100 MCG tablet Take 1 tablet (100 mcg) by mouth daily 90 tablet 3    vitamin D3 (CHOLECALCIFEROL) 50 mcg (2000 units) tablet Take 6,000 unit marking on an U-100 insulin syringe by mouth daily       No current facility-administered medications for this visit.      Past Medical History:   Patient Active Problem List   Diagnosis    GERD (gastroesophageal reflux disease)    Asthma in adult, mild intermittent, uncomplicated     Past Medical History:   Diagnosis Date    NO ACTIVE PROBLEMS     Thyroid disease 2013    Uncomplicated asthma 1986       CC Sophia Caal MD  [address] [place] [address] on close of this encounter.

## 2024-04-25 NOTE — LETTER
4/25/2024         RE: Stephanie Bradshaw  [address]  [facility] [address]        Dear Colleague,    Thank you for referring your patient, Stephanie Bradshaw, to the [facility]. Please see a copy of my visit note below.    [facility] Dermatology Note  Encounter Date: Apr 25, 2024  Office Visit     Reviewed patients past medical history and pertinent chart review prior to patients visit today.     Dermatology Problem List:  NUB mid low back, shave biopsy 04/25/24 .   Recurrent cold sores, valtrex given    ____________________________________________    Assessment & Plan:     # Neoplasm of uncertain behavior:  mid low back  DDx includes irritated nevus vs dysplasia. Shave biopsy today.    Procedure Note: Biopsy by shave technique  The risks and benefits of the procedure were described to the patient. These include but are not limited to bleeding, infection, scar, incomplete removal, and non-diagnostic biopsy. Verbal informed consent was obtained. The above site(s) was cleansed with an alcohol pad and injected with 1% lidocaine with epinephrine. Once anesthesia was obtained, a biopsy(ies) was performed with Gilette blade. The tissue(s) was placed in a labeled container(s) with formalin and sent to pathology. Hemostasis was achieved with aluminum chloride. Vaseline and a bandage were applied to the wound(s). The patient tolerated the procedure well and was given post biopsy care instructions.     # recurrent cold sores. Discussed antiviral treatment with outbreaks. Patient is interested in trying antivirals. Advised to take valtrex 2,000 mg at first sign of an outbreak, and 2,000 mg 12 hours later. Take with each outbreak. Discussed side effects of medication. If patient has more frequent outbreaks we may discuss viral suppression treatment  Discussed breastfeeding exposure to infant but according to [employer]od[employer].com still compatible with breastfeeding.     # Benign skin  findings including:  cherry angioma, lentigines and benign nevi.   - No further intervention required. Patient to report changes.   - Patient reassured of the benign nature of these lesions.    #Signs and Symptoms of non-melanoma skin cancer and ABCDEs of melanoma reviewed with patient. Patient encouraged to perform monthly self skin exams and educated on how to perform them. UV precautions reviewed with patient. Patient was asked about new or changing moles/lesions on body.     #Reviewed Sunscreen: Apply 20 minutes prior to going outdoors and reapply every two hours, when wet or sweating. We recommend using an SPF 30 or higher, and to use one that is water resistant.       Follow-up:  1-2 years for follow up full body skin exam, prn for new or changing lesions or new concerns    Kathie Palma CNP  Dermatology     ____________________________________________    CC: Skin Check (Mole and skin tag that would like removed. Moles increasing in size/Mother BCC)    HPI:  Ms. Stephanie Bradshaw is a(n) 43 year old female who presents today as a new patient for a full body skin cancer screening. Patient has concerns today about an irritated mole on her low back that she thinks may be growing. She would like it removed. She also has a cold sore right now, she has had these since she was a teen.     Patient is otherwise feeling well, without additional skin concerns.     Physical Exam:  Vitals: There were no vitals taken for this visit.  SKIN: Total skin excluding the genitalia areas was performed. The exam included the head/face, neck, both arms, chest, back, abdomen, both legs, digits, mons pubis, buttock and nails.   -right upper lip, healing erosion  -mid low back, 5 mm dome shaped soft pink/brown papule  -several 1-2mm red dome shaped symmetric papules scattered on the trunk  -multiple tan/brown flat round macules and raised papules scattered throughout trunk, extremities and head. No worrisome features for malignancy noted on  examination.  -scattered tan, homogenous macules scattered on sun exposed areas of trunk, extremities and face.     - No other lesions of concern on areas examined.     Medications:  Current Outpatient Medications   Medication Sig Dispense Refill     valACYclovir (VALTREX) 1000 mg tablet Take 2 tablets (2,000 mg) by mouth 2 times daily for 1 day 12 tablet 3     albuterol (PROAIR HFA/PROVENTIL HFA/VENTOLIN HFA) 108 (90 Base) MCG/ACT inhaler Inhale 2 puffs into the lungs as needed       fluticasone (FLONASE) 50 MCG/ACT nasal spray Spray 2 sprays into both nostrils daily as needed 1 spray daily        Prenatal Vit-Fe Fumarate-FA (PRENATAL MULTIVITAMIN W/IRON) 27-0.8 MG tablet 3 tablets daily       sertraline (ZOLOFT) 50 MG tablet TAKE ONE AND ONE-HALF TABLETS BY MOUTH EVERY  tablet 3     SYNTHROID 100 MCG tablet Take 1 tablet (100 mcg) by mouth daily 90 tablet 3     vitamin D3 (CHOLECALCIFEROL) 50 mcg (2000 units) tablet Take 6,000 unit marking on an U-100 insulin syringe by mouth daily       No current facility-administered medications for this visit.      Past Medical History:   Patient Active Problem List   Diagnosis     GERD (gastroesophageal reflux disease)     Asthma in adult, mild intermittent, uncomplicated     Past Medical History:   Diagnosis Date     NO ACTIVE PROBLEMS      Thyroid disease 2013     Uncomplicated asthma 1986       CC Sophia Caal MD  [address] [place] [address] on close of this encounter.      Again, thank you for allowing me to participate in the care of your patient.        Sincerely,        Roxana Palma, HODAN CNP

## 2024-04-29 LAB
PATH REPORT.COMMENTS IMP SPEC: NORMAL
PATH REPORT.COMMENTS IMP SPEC: NORMAL
PATH REPORT.FINAL DX SPEC: NORMAL
PATH REPORT.GROSS SPEC: NORMAL
PATH REPORT.MICROSCOPIC SPEC OTHER STN: NORMAL
PATH REPORT.RELEVANT HX SPEC: NORMAL

## 2024-08-28 ENCOUNTER — PATIENT OUTREACH (OUTPATIENT)
Dept: CARE COORDINATION | Facility: CLINIC | Age: 44
End: 2024-08-28
Payer: COMMERCIAL

## 2024-09-11 ENCOUNTER — PATIENT OUTREACH (OUTPATIENT)
Dept: CARE COORDINATION | Facility: CLINIC | Age: 44
End: 2024-09-11
Payer: COMMERCIAL

## 2024-09-15 ENCOUNTER — HEALTH MAINTENANCE LETTER (OUTPATIENT)
Age: 44
End: 2024-09-15

## 2024-09-30 DIAGNOSIS — R53.82 CHRONIC FATIGUE: ICD-10-CM

## 2024-09-30 DIAGNOSIS — E03.9 HYPOTHYROIDISM, UNSPECIFIED TYPE: ICD-10-CM

## 2024-09-30 RX ORDER — LEVOTHYROXINE SODIUM 100 MCG
100 TABLET ORAL DAILY
Qty: 60 TABLET | Refills: 0 | Status: SHIPPED | OUTPATIENT
Start: 2024-09-30

## 2024-10-02 DIAGNOSIS — N94.3 PREMENSTRUAL SYNDROME: ICD-10-CM

## 2024-10-02 DIAGNOSIS — R53.82 CHRONIC FATIGUE: ICD-10-CM

## 2024-10-02 NOTE — TELEPHONE ENCOUNTER
Pending Prescriptions:                       Disp   Refills    sertraline (ZOLOFT) 50 MG tablet          135 ta*3            Sig: TAKE ONE AND ONE-HALF TABLETS BY MOUTH EVERY DAY

## 2024-10-24 ENCOUNTER — ANCILLARY PROCEDURE (OUTPATIENT)
Dept: MAMMOGRAPHY | Facility: CLINIC | Age: 44
End: 2024-10-24
Attending: FAMILY MEDICINE
Payer: COMMERCIAL

## 2024-10-24 DIAGNOSIS — Z12.31 VISIT FOR SCREENING MAMMOGRAM: ICD-10-CM

## 2024-10-24 PROCEDURE — 77067 SCR MAMMO BI INCL CAD: CPT | Mod: TC | Performed by: STUDENT IN AN ORGANIZED HEALTH CARE EDUCATION/TRAINING PROGRAM

## 2024-10-24 PROCEDURE — 77063 BREAST TOMOSYNTHESIS BI: CPT | Mod: TC | Performed by: STUDENT IN AN ORGANIZED HEALTH CARE EDUCATION/TRAINING PROGRAM

## 2024-10-30 ENCOUNTER — ANCILLARY PROCEDURE (OUTPATIENT)
Dept: MAMMOGRAPHY | Facility: CLINIC | Age: 44
End: 2024-10-30
Attending: FAMILY MEDICINE
Payer: COMMERCIAL

## 2024-10-30 DIAGNOSIS — R92.8 ABNORMAL MAMMOGRAM: ICD-10-CM

## 2024-10-30 PROCEDURE — G0279 TOMOSYNTHESIS, MAMMO: HCPCS | Performed by: STUDENT IN AN ORGANIZED HEALTH CARE EDUCATION/TRAINING PROGRAM

## 2024-10-30 PROCEDURE — 77065 DX MAMMO INCL CAD UNI: CPT | Mod: RT | Performed by: STUDENT IN AN ORGANIZED HEALTH CARE EDUCATION/TRAINING PROGRAM

## 2024-10-30 PROCEDURE — 76642 ULTRASOUND BREAST LIMITED: CPT | Mod: RT | Performed by: STUDENT IN AN ORGANIZED HEALTH CARE EDUCATION/TRAINING PROGRAM

## 2024-11-03 LAB
ABO/RH(D): NORMAL
ANTIBODY SCREEN: NEGATIVE
SPECIMEN EXPIRATION DATE: NORMAL

## 2024-11-04 ENCOUNTER — LAB (OUTPATIENT)
Dept: LAB | Facility: CLINIC | Age: 44
End: 2024-11-04
Payer: COMMERCIAL

## 2024-11-04 DIAGNOSIS — Z31.41 FERTILITY TESTING: Primary | ICD-10-CM

## 2024-11-04 LAB
ALBUMIN SERPL BCG-MCNC: 4.6 G/DL (ref 3.5–5.2)
ALP SERPL-CCNC: 93 U/L (ref 40–150)
ALT SERPL W P-5'-P-CCNC: 13 U/L (ref 0–50)
ANION GAP SERPL CALCULATED.3IONS-SCNC: 12 MMOL/L (ref 7–15)
AST SERPL W P-5'-P-CCNC: 16 U/L (ref 0–45)
BASOPHILS # BLD AUTO: 0.1 10E3/UL (ref 0–0.2)
BASOPHILS NFR BLD AUTO: 1 %
BILIRUB SERPL-MCNC: 0.4 MG/DL
BUN SERPL-MCNC: 11.7 MG/DL (ref 6–20)
CALCIUM SERPL-MCNC: 9.7 MG/DL (ref 8.8–10.4)
CHLORIDE SERPL-SCNC: 102 MMOL/L (ref 98–107)
CREAT SERPL-MCNC: 0.87 MG/DL (ref 0.51–0.95)
EGFRCR SERPLBLD CKD-EPI 2021: 84 ML/MIN/1.73M2
EOSINOPHIL # BLD AUTO: 0.1 10E3/UL (ref 0–0.7)
EOSINOPHIL NFR BLD AUTO: 1 %
ERYTHROCYTE [DISTWIDTH] IN BLOOD BY AUTOMATED COUNT: 12.8 % (ref 10–15)
EST. AVERAGE GLUCOSE BLD GHB EST-MCNC: 100 MG/DL
ESTRADIOL SERPL-MCNC: 68 PG/ML
FSH SERPL IRP2-ACNC: 6.4 MIU/ML
GLUCOSE SERPL-MCNC: 115 MG/DL (ref 70–99)
HBA1C MFR BLD: 5.1 %
HBV SURFACE AG SERPL QL IA: NONREACTIVE
HCG INTACT+B SERPL-ACNC: <1 MIU/ML
HCO3 SERPL-SCNC: 25 MMOL/L (ref 22–29)
HCT VFR BLD AUTO: 43.7 % (ref 35–47)
HCV AB SERPL QL IA: NONREACTIVE
HGB BLD-MCNC: 14.9 G/DL (ref 11.7–15.7)
HIV 1+2 AB+HIV1 P24 AG SERPL QL IA: NONREACTIVE
IMM GRANULOCYTES # BLD: 0 10E3/UL
IMM GRANULOCYTES NFR BLD: 0 %
LH SERPL-ACNC: 2.4 MIU/ML
LYMPHOCYTES # BLD AUTO: 2.4 10E3/UL (ref 0.8–5.3)
LYMPHOCYTES NFR BLD AUTO: 35 %
MCH RBC QN AUTO: 30.6 PG (ref 26.5–33)
MCHC RBC AUTO-ENTMCNC: 34.1 G/DL (ref 31.5–36.5)
MCV RBC AUTO: 90 FL (ref 78–100)
MIS SERPL-MCNC: 0.13 NG/ML (ref 0.03–5.5)
MONOCYTES # BLD AUTO: 0.4 10E3/UL (ref 0–1.3)
MONOCYTES NFR BLD AUTO: 5 %
NEUTROPHILS # BLD AUTO: 3.9 10E3/UL (ref 1.6–8.3)
NEUTROPHILS NFR BLD AUTO: 58 %
NRBC # BLD AUTO: 0 10E3/UL
NRBC BLD AUTO-RTO: 0 /100
PLATELET # BLD AUTO: 233 10E3/UL (ref 150–450)
POTASSIUM SERPL-SCNC: 3.8 MMOL/L (ref 3.4–5.3)
PROGEST SERPL-MCNC: 5.4 NG/ML
PROLACTIN SERPL 3RD IS-MCNC: 9 NG/ML (ref 5–23)
PROT SERPL-MCNC: 7.2 G/DL (ref 6.4–8.3)
RBC # BLD AUTO: 4.87 10E6/UL (ref 3.8–5.2)
RUBV IGG SERPL QL IA: 1.56 INDEX
RUBV IGG SERPL QL IA: POSITIVE
SODIUM SERPL-SCNC: 139 MMOL/L (ref 135–145)
T PALLIDUM AB SER QL: NONREACTIVE
TSH SERPL DL<=0.005 MIU/L-ACNC: 0.9 UIU/ML (ref 0.3–4.2)
VIT D+METAB SERPL-MCNC: 53 NG/ML (ref 20–50)
VZV IGG SER QL IA: 11.7 S/CO
VZV IGG SER QL IA: POSITIVE
WBC # BLD AUTO: 6.7 10E3/UL (ref 4–11)

## 2024-11-04 PROCEDURE — 86803 HEPATITIS C AB TEST: CPT

## 2024-11-04 PROCEDURE — 82670 ASSAY OF TOTAL ESTRADIOL: CPT

## 2024-11-04 PROCEDURE — 84144 ASSAY OF PROGESTERONE: CPT

## 2024-11-04 PROCEDURE — 85025 COMPLETE CBC W/AUTO DIFF WBC: CPT

## 2024-11-04 PROCEDURE — 83036 HEMOGLOBIN GLYCOSYLATED A1C: CPT

## 2024-11-04 PROCEDURE — 84146 ASSAY OF PROLACTIN: CPT

## 2024-11-04 PROCEDURE — 36415 COLL VENOUS BLD VENIPUNCTURE: CPT

## 2024-11-04 PROCEDURE — 84702 CHORIONIC GONADOTROPIN TEST: CPT

## 2024-11-04 PROCEDURE — 80053 COMPREHEN METABOLIC PANEL: CPT

## 2024-11-04 PROCEDURE — 83001 ASSAY OF GONADOTROPIN (FSH): CPT

## 2024-11-04 PROCEDURE — 82306 VITAMIN D 25 HYDROXY: CPT

## 2024-11-04 PROCEDURE — 84443 ASSAY THYROID STIM HORMONE: CPT

## 2024-11-04 PROCEDURE — 83002 ASSAY OF GONADOTROPIN (LH): CPT

## 2024-11-04 PROCEDURE — 86787 VARICELLA-ZOSTER ANTIBODY: CPT

## 2024-11-04 PROCEDURE — 86900 BLOOD TYPING SEROLOGIC ABO: CPT

## 2024-11-04 PROCEDURE — 82166 ASSAY ANTI-MULLERIAN HORM: CPT

## 2024-11-04 PROCEDURE — 86901 BLOOD TYPING SEROLOGIC RH(D): CPT

## 2024-11-04 PROCEDURE — 86762 RUBELLA ANTIBODY: CPT

## 2024-11-04 PROCEDURE — 86780 TREPONEMA PALLIDUM: CPT

## 2024-11-04 PROCEDURE — 87389 HIV-1 AG W/HIV-1&-2 AB AG IA: CPT

## 2024-11-04 PROCEDURE — 87340 HEPATITIS B SURFACE AG IA: CPT

## 2024-11-04 PROCEDURE — 84403 ASSAY OF TOTAL TESTOSTERONE: CPT

## 2024-11-06 LAB — TESTOST SERPL-MCNC: 21 NG/DL (ref 8–60)

## 2024-11-14 ENCOUNTER — OFFICE VISIT (OUTPATIENT)
Dept: FAMILY MEDICINE | Facility: CLINIC | Age: 44
End: 2024-11-14
Payer: COMMERCIAL

## 2024-11-14 VITALS
TEMPERATURE: 97.2 F | BODY MASS INDEX: 33.26 KG/M2 | WEIGHT: 187.7 LBS | DIASTOLIC BLOOD PRESSURE: 83 MMHG | HEART RATE: 70 BPM | SYSTOLIC BLOOD PRESSURE: 125 MMHG | OXYGEN SATURATION: 99 % | RESPIRATION RATE: 20 BRPM | HEIGHT: 63 IN

## 2024-11-14 DIAGNOSIS — N94.3 PREMENSTRUAL SYNDROME: ICD-10-CM

## 2024-11-14 DIAGNOSIS — J45.20 ASTHMA IN ADULT, MILD INTERMITTENT, UNCOMPLICATED: ICD-10-CM

## 2024-11-14 DIAGNOSIS — Z00.00 ROUTINE GENERAL MEDICAL EXAMINATION AT A HEALTH CARE FACILITY: Primary | ICD-10-CM

## 2024-11-14 DIAGNOSIS — E03.9 HYPOTHYROIDISM, UNSPECIFIED TYPE: ICD-10-CM

## 2024-11-14 DIAGNOSIS — R53.82 CHRONIC FATIGUE: ICD-10-CM

## 2024-11-14 PROCEDURE — 91320 SARSCV2 VAC 30MCG TRS-SUC IM: CPT | Performed by: FAMILY MEDICINE

## 2024-11-14 PROCEDURE — 90480 ADMN SARSCOV2 VAC 1/ONLY CMP: CPT | Performed by: FAMILY MEDICINE

## 2024-11-14 PROCEDURE — 36415 COLL VENOUS BLD VENIPUNCTURE: CPT | Performed by: FAMILY MEDICINE

## 2024-11-14 PROCEDURE — 99396 PREV VISIT EST AGE 40-64: CPT | Performed by: FAMILY MEDICINE

## 2024-11-14 PROCEDURE — 99213 OFFICE O/P EST LOW 20 MIN: CPT | Mod: 25 | Performed by: FAMILY MEDICINE

## 2024-11-14 PROCEDURE — 80061 LIPID PANEL: CPT | Performed by: FAMILY MEDICINE

## 2024-11-14 RX ORDER — LEVOTHYROXINE SODIUM 100 MCG
100 TABLET ORAL DAILY
Qty: 90 TABLET | Refills: 4 | Status: SHIPPED | OUTPATIENT
Start: 2024-11-14

## 2024-11-14 SDOH — HEALTH STABILITY: PHYSICAL HEALTH: ON AVERAGE, HOW MANY MINUTES DO YOU ENGAGE IN EXERCISE AT THIS LEVEL?: 10 MIN

## 2024-11-14 SDOH — HEALTH STABILITY: PHYSICAL HEALTH: ON AVERAGE, HOW MANY DAYS PER WEEK DO YOU ENGAGE IN MODERATE TO STRENUOUS EXERCISE (LIKE A BRISK WALK)?: 1 DAY

## 2024-11-14 ASSESSMENT — ASTHMA QUESTIONNAIRES
QUESTION_3 LAST FOUR WEEKS HOW OFTEN DID YOUR ASTHMA SYMPTOMS (WHEEZING, COUGHING, SHORTNESS OF BREATH, CHEST TIGHTNESS OR PAIN) WAKE YOU UP AT NIGHT OR EARLIER THAN USUAL IN THE MORNING: NOT AT ALL
QUESTION_2 LAST FOUR WEEKS HOW OFTEN HAVE YOU HAD SHORTNESS OF BREATH: NOT AT ALL
ACT_TOTALSCORE: 25
QUESTION_4 LAST FOUR WEEKS HOW OFTEN HAVE YOU USED YOUR RESCUE INHALER OR NEBULIZER MEDICATION (SUCH AS ALBUTEROL): NOT AT ALL
QUESTION_1 LAST FOUR WEEKS HOW MUCH OF THE TIME DID YOUR ASTHMA KEEP YOU FROM GETTING AS MUCH DONE AT WORK, SCHOOL OR AT HOME: NONE OF THE TIME
QUESTION_5 LAST FOUR WEEKS HOW WOULD YOU RATE YOUR ASTHMA CONTROL: COMPLETELY CONTROLLED
ACT_TOTALSCORE: 25

## 2024-11-14 ASSESSMENT — SOCIAL DETERMINANTS OF HEALTH (SDOH): HOW OFTEN DO YOU GET TOGETHER WITH FRIENDS OR RELATIVES?: NEVER

## 2024-11-14 NOTE — PROGRESS NOTES
"Preventive Care Visit  [facility]  Camacholionel Alonso Caal MD, Family Medicine  Nov 14, 2024      Assessment & Plan     Routine general medical examination at a health care facility  - Due to family history no increased colon cancer risk, routine screening at age 45.    - up to date with mammogram, skin check   - Lipid panel reflex to direct LDL Non-fasting; Future  - Lipid panel reflex to direct LDL Non-fasting    Chronic fatigue  - sertraline (ZOLOFT) 50 MG tablet; TAKE ONE AND ONE-HALF TABLETS BY MOUTH EVERY DAY  - SYNTHROID 100 MCG tablet; Take 1 tablet (100 mcg) by mouth daily.    Premenstrual syndrome  - sertraline (ZOLOFT) 50 MG tablet; TAKE ONE AND ONE-HALF TABLETS BY MOUTH EVERY DAY    Hypothyroidism, unspecified type  - SYNTHROID 100 MCG tablet; Take 1 tablet (100 mcg) by mouth daily.    Asthma in adult, mild intermittent, uncomplicated      9/26/2023     8:15 PM 3/21/2024     8:03 AM 11/14/2024     9:02 AM   ACT Total Scores   ACT TOTAL SCORE (Goal Greater than or Equal to 20) 25 25 25    In the past 12 months, how many times did you visit the emergency room for your asthma without being admitted to the hospital? 0  0  0    In the past 12 months, how many times were you hospitalized overnight because of your asthma? 0  0  0        Patient-reported                BMI  Estimated body mass index is 32.76 kg/m  as calculated from the following:    Height as of this encounter: 1.612 m (5' 3.47\").    Weight as of this encounter: 85.1 kg (187 lb 11.2 oz).       Counseling  Appropriate preventive services were addressed with this patient via screening, questionnaire, or discussion as appropriate for fall prevention, nutrition, physical activity, Tobacco-use cessation, social engagement, weight loss and cognition.  Checklist reviewing preventive services available has been given to the patient.  Reviewed patient's diet, addressing concerns and/or questions.   She is at risk for lack of " exercise and has been provided with information to increase physical activity for the benefit of her well-being.   Patient is at risk for social isolation and has been provided with information about the benefit of social connection.           Victor Manuel Brown is a 43 year old, presenting for the following:  Physical        11/14/2024    12:48 PM   Additional Questions   Roomed by jose   Accompanied by self          HPI    Health Care Directive  Patient does not have a Health Care Directive: Discussed advance care planning with patient; information given to patient to review.      11/14/2024   General Health   How would you rate your overall physical health? Good   Feel stress (tense, anxious, or unable to sleep) Only a little      (!) STRESS CONCERN      11/14/2024   Nutrition   Three or more servings of calcium each day? (!) I DON'T KNOW   Diet: Vegetarian/vegan   How many servings of fruit and vegetables per day? (!) 2-3   How many sweetened beverages each day? 0-1            11/14/2024   Exercise   Days per week of moderate/strenous exercise 1 day   Average minutes spent exercising at this level 10 min      (!) EXERCISE CONCERN      11/14/2024   Social Factors   Frequency of gathering with friends or relatives Never   Worry food won't last until get money to buy more No   Food not last or not have enough money for food? No   Do you have housing? (Housing is defined as stable permanent housing and does not include staying ouside in a car, in a tent, in an abandoned building, in an overnight shelter, or couch-surfing.) Yes   Are you worried about losing your housing? No   Lack of transportation? No   Unable to get utilities (heat,electricity)? No      (!) SOCIAL CONNECTIONS CONCERN      11/14/2024   Dental   Dentist two times every year? Yes              11/14/2024   Substance Use   Alcohol more than 3/day or more than 7/wk No   Do you use any other substances recreationally? No        Social History     Tobacco  Use    Smoking status: Former     Current packs/day: 0.00     Types: Cigarettes     Quit date: 1/19/2009     Years since quitting: 15.8    Smokeless tobacco: Never   Vaping Use    Vaping status: Never Used   Substance Use Topics    Alcohol use: Yes     Comment: rarely           10/24/2024   LAST [facility]-7 RESULTS   1st degree relative breast or ovarian cancer No   Any relative bilateral breast cancer No   Any male have breast cancer No   Any ONE woman have BOTH breast AND ovarian cancer No   Any woman with breast cancer before 50yrs Yes   2 or more relatives with breast AND/OR ovarian cancer Yes   2 or more relatives with breast AND/OR bowel cancer Yes    Paternal grandfather and paternal uncle diagnosed with colon cancer. Not in contact with Dad, unsure of his history. Unsure but thinks family was diagnosed at age 50-60's. She was seen by genetic screening.      Genetic counselor recommendations 4/21/2020 -   Screening:  Based on this negative test result, it is important for Stephanie and her relatives to refer back to the family history for appropriate cancer screening.    Based on the personal and family history information she provided, Stephanie does not meet current National Comprehensive Cancer Network (NCCN) guidelines for high risk breast screening based on the GRICELDA Risk Evaluator v8 model. As such, Stephanie should continue with routine breast imaging. In addition, Stephanie should be receiving clinical breast exams by her physician. Her risks may change over time (due to personal or family history factors), therefore, she is encouraged to keep in touch with me to discuss breast cancer screening options in the future.   She should also continue with routine gynecologic exams, skin exams, and colonoscopy screening. Other population cancer screening options, such as those recommended by the American Cancer Society and the National Comprehensive Cancer Network (NCCN), are also appropriate for Stephanie and her family. These  "screening recommendations may change if there are changes to Stephanie's personal and/or family history of cancer. Final screening recommendations should be made by each individual's managing physician.               11/14/2024   STI Screening   New sexual partner(s) since last STI/HIV test? No        History of abnormal Pap smear: No - age 30- 64 PAP with HPV every 5 years recommended        Latest Ref Rng & Units 10/21/2020     6:00 PM 10/21/2020     5:54 PM 10/31/2017    12:00 AM   PAP / HPV   PAP (Historical)   NIL     HPV 16 DNA NEG^Negative Negative      HPV 18 DNA NEG^Negative Negative      Other HR HPV NEG^Negative Negative      PAP-ABSTRACT    See Scanned Document           This result is from an external source.     ASCVD Risk   The ASCVD Risk score (Campbell DK, et al., 2019) failed to calculate for the following reasons:    The systolic blood pressure is missing        11/14/2024   Contraception/Family Planning   Questions about contraception or family planning No           Reviewed and updated as needed this visit by Provider                             Objective    Exam  LMP 11/07/2024    Estimated body mass index is 31.97 kg/m  as calculated from the following:    Height as of 9/27/23: 1.62 m (5' 3.78\").    Weight as of 9/27/23: 83.9 kg (185 lb).    Physical Exam  GENERAL: alert and no distress  EYES: Eyes grossly normal to inspection  HENT: ear canals and TM's normal  NECK: no adenopathy, no asymmetry, masses, or scars  RESP: lungs clear to auscultation - no rales, rhonchi or wheezes  CV: regular rate and rhythm, normal S1 S2  ABDOMEN: soft, nontender, no hepatosplenomegaly, no masses and bowel sounds normal  MS: no gross musculoskeletal defects noted, no edema  SKIN: no suspicious lesions or rashes  NEURO: Normal strength and tone, mentation intact and speech normal  PSYCH: mentation appears normal, affect normal        Signed Electronically by: Sophia Caal MD    "

## 2024-11-14 NOTE — PATIENT INSTRUCTIONS
Patient Education   Preventive Care Advice   This is general advice given by our system to help you stay healthy. However, your care team may have specific advice just for you. Please talk to your care team about your preventive care needs.  Nutrition  Eat 5 or more servings of fruits and vegetables each day.  Try wheat bread, brown rice and whole grain pasta (instead of white bread, rice, and pasta).  Get enough calcium and vitamin D. Check the label on foods and aim for 100% of the RDA (recommended daily allowance).  Lifestyle  Exercise at least 150 minutes each week  (30 minutes a day, 5 days a week).  Do muscle strengthening activities 2 days a week. These help control your weight and prevent disease.  No smoking.  Wear sunscreen to prevent skin cancer.  Have a dental exam and cleaning every 6 months.  Yearly exams  See your health care team every year to talk about:  Any changes in your health.  Any medicines your care team has prescribed.  Preventive care, family planning, and ways to prevent chronic diseases.  Shots (vaccines)   HPV shots (up to age 26), if you've never had them before.  Hepatitis B shots (up to age 59), if you've never had them before.  COVID-19 shot: Get this shot when it's due.  Flu shot: Get a flu shot every year.  Tetanus shot: Get a tetanus shot every 10 years.  Pneumococcal, hepatitis A, and RSV shots: Ask your care team if you need these based on your risk.  Shingles shot (for age 50 and up)  General health tests  Diabetes screening:  Starting at age 35, Get screened for diabetes at least every 3 years.  If you are younger than age 35, ask your care team if you should be screened for diabetes.  Cholesterol test: At age 39, start having a cholesterol test every 5 years, or more often if advised.  Bone density scan (DEXA): At age 50, ask your care team if you should have this scan for osteoporosis (brittle bones).  Hepatitis C: Get tested at least once in your life.  STIs (sexually  Patient cancelled appointment for next week due to having injections in her spine. Patient would like to ask a nurse if she can please discontinue her antibiotics Melinda had prescribed for her and also if she really needs a follow up. Patient did schedule for 02/18/20, but feels she really does not need to see a dermatologist anymore. Please call patient back to discuss, thank you.    transmitted infections)  Before age 24: Ask your care team if you should be screened for STIs.  After age 24: Get screened for STIs if you're at risk. You are at risk for STIs (including HIV) if:  You are sexually active with more than one person.  You don't use condoms every time.  You or a partner was diagnosed with a sexually transmitted infection.  If you are at risk for HIV, ask about PrEP medicine to prevent HIV.  Get tested for HIV at least once in your life, whether you are at risk for HIV or not.  Cancer screening tests  Cervical cancer screening: If you have a cervix, begin getting regular cervical cancer screening tests starting at age 21.  Breast cancer scan (mammogram): If you've ever had breasts, begin having regular mammograms starting at age 40. This is a scan to check for breast cancer.  Colon cancer screening: It is important to start screening for colon cancer at age 45.  Have a colonoscopy test every 10 years (or more often if you're at risk) Or, ask your provider about stool tests like a FIT test every year or Cologuard test every 3 years.  To learn more about your testing options, visit:   .  For help making a decision, visit:   https://bit.ly/jy13781.  Prostate cancer screening test: If you have a prostate, ask your care team if a prostate cancer screening test (PSA) at age 55 is right for you.  Lung cancer screening: If you are a current or former smoker ages 50 to 80, ask your care team if ongoing lung cancer screenings are right for you.  For informational purposes only. Not to replace the advice of your health care provider. Copyright   2023 [employer]. All rights reserved. Clinically reviewed by the [facility]. [employer] [id number] - REV 01/24.

## 2024-11-15 LAB
CHOLEST SERPL-MCNC: 192 MG/DL
FASTING STATUS PATIENT QL REPORTED: NO
HDLC SERPL-MCNC: 55 MG/DL
LDLC SERPL CALC-MCNC: 107 MG/DL
NONHDLC SERPL-MCNC: 137 MG/DL
TRIGL SERPL-MCNC: 152 MG/DL

## 2024-12-05 ENCOUNTER — LAB (OUTPATIENT)
Dept: LAB | Facility: CLINIC | Age: 44
End: 2024-12-05
Payer: COMMERCIAL

## 2024-12-05 DIAGNOSIS — Z31.83 ENCOUNTER FOR ASSISTED REPRODUCTIVE FERTILITY CYCLE: Primary | ICD-10-CM

## 2024-12-05 LAB
ESTRADIOL SERPL-MCNC: 9 PG/ML
FSH SERPL IRP2-ACNC: 24.6 MIU/ML
HCG INTACT+B SERPL-ACNC: <1 MIU/ML
LH SERPL-ACNC: 11.2 MIU/ML
PROGEST SERPL-MCNC: 0.3 NG/ML
TSH SERPL DL<=0.005 MIU/L-ACNC: 1.56 UIU/ML (ref 0.3–4.2)

## 2024-12-12 ENCOUNTER — LAB (OUTPATIENT)
Dept: LAB | Facility: CLINIC | Age: 44
End: 2024-12-12
Payer: COMMERCIAL

## 2024-12-12 DIAGNOSIS — Z31.83 ENCOUNTER FOR ASSISTED REPRODUCTIVE FERTILITY CYCLE: Primary | ICD-10-CM

## 2024-12-12 LAB
ESTRADIOL SERPL-MCNC: 1040 PG/ML
LH SERPL-ACNC: 6.5 MIU/ML
PROGEST SERPL-MCNC: 0.2 NG/ML

## 2024-12-23 ENCOUNTER — LAB (OUTPATIENT)
Dept: LAB | Facility: CLINIC | Age: 44
End: 2024-12-23
Payer: COMMERCIAL

## 2024-12-23 DIAGNOSIS — Z31.49 INVESTIGATION AND TESTING FOR PROCREATION MANAGEMENT: Primary | ICD-10-CM

## 2024-12-23 LAB
ESTRADIOL SERPL-MCNC: 514 PG/ML
PROGEST SERPL-MCNC: 25.8 NG/ML

## 2024-12-23 PROCEDURE — 82670 ASSAY OF TOTAL ESTRADIOL: CPT

## 2024-12-23 PROCEDURE — 36415 COLL VENOUS BLD VENIPUNCTURE: CPT

## 2024-12-23 PROCEDURE — 84144 ASSAY OF PROGESTERONE: CPT

## 2024-12-27 ENCOUNTER — LAB (OUTPATIENT)
Dept: LAB | Facility: CLINIC | Age: 44
End: 2024-12-27
Payer: COMMERCIAL

## 2024-12-27 DIAGNOSIS — Z32.00 PREGNANCY EXAMINATION OR TEST, PREGNANCY UNCONFIRMED: Primary | ICD-10-CM

## 2024-12-27 LAB
HCG INTACT+B SERPL-ACNC: 38 MIU/ML
PROGEST SERPL-MCNC: 39 NG/ML

## 2024-12-27 PROCEDURE — 84702 CHORIONIC GONADOTROPIN TEST: CPT

## 2024-12-27 PROCEDURE — 84144 ASSAY OF PROGESTERONE: CPT

## 2024-12-27 PROCEDURE — 36415 COLL VENOUS BLD VENIPUNCTURE: CPT

## 2024-12-30 ENCOUNTER — LAB (OUTPATIENT)
Dept: LAB | Facility: CLINIC | Age: 44
End: 2024-12-30
Payer: COMMERCIAL

## 2024-12-30 DIAGNOSIS — Z32.01 PREGNANCY EXAMINATION OR TEST, POSITIVE RESULT: Primary | ICD-10-CM

## 2024-12-30 LAB
ESTRADIOL SERPL-MCNC: 900 PG/ML
HCG INTACT+B SERPL-ACNC: 19 MIU/ML
PROGEST SERPL-MCNC: 29.5 NG/ML
TSH SERPL DL<=0.005 MIU/L-ACNC: 2.4 UIU/ML (ref 0.3–4.2)

## 2024-12-30 PROCEDURE — 84144 ASSAY OF PROGESTERONE: CPT

## 2024-12-30 PROCEDURE — 36415 COLL VENOUS BLD VENIPUNCTURE: CPT

## 2024-12-30 PROCEDURE — 84702 CHORIONIC GONADOTROPIN TEST: CPT

## 2024-12-30 PROCEDURE — 84443 ASSAY THYROID STIM HORMONE: CPT

## 2024-12-30 PROCEDURE — 82670 ASSAY OF TOTAL ESTRADIOL: CPT

## 2025-01-03 ENCOUNTER — ANCILLARY PROCEDURE (OUTPATIENT)
Dept: GENERAL RADIOLOGY | Facility: CLINIC | Age: 45
End: 2025-01-03
Attending: PHYSICIAN ASSISTANT
Payer: COMMERCIAL

## 2025-01-03 DIAGNOSIS — R05.1 ACUTE COUGH: ICD-10-CM

## 2025-01-03 PROCEDURE — 71046 X-RAY EXAM CHEST 2 VIEWS: CPT | Mod: TC | Performed by: RADIOLOGY

## 2025-01-07 ENCOUNTER — LAB (OUTPATIENT)
Dept: LAB | Facility: CLINIC | Age: 45
End: 2025-01-07
Payer: COMMERCIAL

## 2025-01-07 DIAGNOSIS — Z31.83 ENCOUNTER FOR ASSISTED REPRODUCTIVE FERTILITY CYCLE: Primary | ICD-10-CM

## 2025-01-07 LAB
ESTRADIOL SERPL-MCNC: 13 PG/ML
FSH SERPL IRP2-ACNC: 22.7 MIU/ML
HCG INTACT+B SERPL-ACNC: <1 MIU/ML
LH SERPL-ACNC: 7.8 MIU/ML
PROGEST SERPL-MCNC: 0.3 NG/ML
TSH SERPL DL<=0.005 MIU/L-ACNC: 0.88 UIU/ML (ref 0.3–4.2)

## 2025-01-07 PROCEDURE — 36415 COLL VENOUS BLD VENIPUNCTURE: CPT

## 2025-01-07 PROCEDURE — 82670 ASSAY OF TOTAL ESTRADIOL: CPT

## 2025-01-07 PROCEDURE — 84702 CHORIONIC GONADOTROPIN TEST: CPT

## 2025-01-07 PROCEDURE — 83001 ASSAY OF GONADOTROPIN (FSH): CPT

## 2025-01-07 PROCEDURE — 84144 ASSAY OF PROGESTERONE: CPT

## 2025-01-07 PROCEDURE — 83002 ASSAY OF GONADOTROPIN (LH): CPT

## 2025-01-07 PROCEDURE — 84443 ASSAY THYROID STIM HORMONE: CPT

## 2025-01-14 ENCOUNTER — LAB (OUTPATIENT)
Dept: LAB | Facility: CLINIC | Age: 45
End: 2025-01-14
Payer: COMMERCIAL

## 2025-01-14 DIAGNOSIS — Z31.83 ENCOUNTER FOR ASSISTED REPRODUCTIVE FERTILITY CYCLE: Primary | ICD-10-CM

## 2025-01-14 LAB
ESTRADIOL SERPL-MCNC: 642 PG/ML
LH SERPL-ACNC: 5.3 MIU/ML
PROGEST SERPL-MCNC: 0.2 NG/ML

## 2025-01-14 PROCEDURE — 83002 ASSAY OF GONADOTROPIN (LH): CPT

## 2025-01-14 PROCEDURE — 84144 ASSAY OF PROGESTERONE: CPT

## 2025-01-14 PROCEDURE — 82670 ASSAY OF TOTAL ESTRADIOL: CPT

## 2025-01-14 PROCEDURE — 36415 COLL VENOUS BLD VENIPUNCTURE: CPT

## 2025-01-28 ENCOUNTER — LAB (OUTPATIENT)
Dept: LAB | Facility: CLINIC | Age: 45
End: 2025-01-28
Payer: COMMERCIAL

## 2025-01-28 DIAGNOSIS — Z31.41 FERTILITY TESTING: Primary | ICD-10-CM

## 2025-01-28 LAB
ESTRADIOL SERPL-MCNC: 1082 PG/ML
PROGEST SERPL-MCNC: 31.6 NG/ML

## 2025-01-28 PROCEDURE — 84144 ASSAY OF PROGESTERONE: CPT

## 2025-01-28 PROCEDURE — 82670 ASSAY OF TOTAL ESTRADIOL: CPT

## 2025-01-28 PROCEDURE — 36415 COLL VENOUS BLD VENIPUNCTURE: CPT

## 2025-02-03 ENCOUNTER — LAB (OUTPATIENT)
Dept: LAB | Facility: CLINIC | Age: 45
End: 2025-02-03
Payer: COMMERCIAL

## 2025-02-03 DIAGNOSIS — Z32.00 PREGNANCY EXAMINATION OR TEST, PREGNANCY UNCONFIRMED: Primary | ICD-10-CM

## 2025-02-03 LAB
HCG INTACT+B SERPL-ACNC: 11 MIU/ML
PROGEST SERPL-MCNC: 41.4 NG/ML

## 2025-02-03 PROCEDURE — 84144 ASSAY OF PROGESTERONE: CPT

## 2025-02-03 PROCEDURE — 84702 CHORIONIC GONADOTROPIN TEST: CPT

## 2025-02-03 PROCEDURE — 36415 COLL VENOUS BLD VENIPUNCTURE: CPT

## 2025-02-05 ENCOUNTER — LAB (OUTPATIENT)
Dept: LAB | Facility: CLINIC | Age: 45
End: 2025-02-05
Payer: COMMERCIAL

## 2025-02-05 DIAGNOSIS — Z32.01 PREGNANCY EXAMINATION OR TEST, POSITIVE RESULT: Primary | ICD-10-CM

## 2025-02-05 LAB
ESTRADIOL SERPL-MCNC: 782 PG/ML
HCG INTACT+B SERPL-ACNC: 13 MIU/ML
PROGEST SERPL-MCNC: 36.8 NG/ML
TSH SERPL DL<=0.005 MIU/L-ACNC: 0.94 UIU/ML (ref 0.3–4.2)

## 2025-02-05 PROCEDURE — 84443 ASSAY THYROID STIM HORMONE: CPT

## 2025-02-05 PROCEDURE — 84702 CHORIONIC GONADOTROPIN TEST: CPT

## 2025-02-05 PROCEDURE — 36415 COLL VENOUS BLD VENIPUNCTURE: CPT

## 2025-02-05 PROCEDURE — 84144 ASSAY OF PROGESTERONE: CPT

## 2025-02-05 PROCEDURE — 82670 ASSAY OF TOTAL ESTRADIOL: CPT

## 2025-02-07 ENCOUNTER — LAB (OUTPATIENT)
Dept: LAB | Facility: CLINIC | Age: 45
End: 2025-02-07
Payer: COMMERCIAL

## 2025-02-07 DIAGNOSIS — O02.81 INAPPROPRIATE CHANGE IN QUANTITATIVE HUMAN CHORIONIC GONADOTROPIN (HCG) IN EARLY PREGNANCY: Primary | ICD-10-CM

## 2025-02-07 LAB
ESTRADIOL SERPL-MCNC: 265 PG/ML
HCG INTACT+B SERPL-ACNC: 4 MIU/ML
PROGEST SERPL-MCNC: 24.8 NG/ML

## 2025-02-07 PROCEDURE — 84702 CHORIONIC GONADOTROPIN TEST: CPT

## 2025-02-07 PROCEDURE — 82670 ASSAY OF TOTAL ESTRADIOL: CPT

## 2025-02-07 PROCEDURE — 84144 ASSAY OF PROGESTERONE: CPT

## 2025-02-07 PROCEDURE — 36415 COLL VENOUS BLD VENIPUNCTURE: CPT
